# Patient Record
Sex: MALE | Race: ASIAN | NOT HISPANIC OR LATINO | Employment: FULL TIME | URBAN - METROPOLITAN AREA
[De-identification: names, ages, dates, MRNs, and addresses within clinical notes are randomized per-mention and may not be internally consistent; named-entity substitution may affect disease eponyms.]

---

## 2024-02-01 ENCOUNTER — HOSPITAL ENCOUNTER (EMERGENCY)
Facility: CLINIC | Age: 49
Discharge: LEFT BEFORE DISCHARGE | End: 2024-02-01
Admitting: EMERGENCY MEDICINE
Payer: COMMERCIAL

## 2024-02-01 VITALS
BODY MASS INDEX: 23.54 KG/M2 | HEART RATE: 133 BPM | HEIGHT: 67 IN | DIASTOLIC BLOOD PRESSURE: 103 MMHG | SYSTOLIC BLOOD PRESSURE: 183 MMHG | WEIGHT: 150 LBS | RESPIRATION RATE: 19 BRPM | TEMPERATURE: 98 F | OXYGEN SATURATION: 100 %

## 2024-02-01 LAB
ALBUMIN SERPL BCG-MCNC: 4.4 G/DL (ref 3.5–5.2)
ALP SERPL-CCNC: 121 U/L (ref 40–150)
ALT SERPL W P-5'-P-CCNC: 31 U/L (ref 0–70)
ANION GAP SERPL CALCULATED.3IONS-SCNC: 12 MMOL/L (ref 7–15)
AST SERPL W P-5'-P-CCNC: 26 U/L (ref 0–45)
BASOPHILS # BLD AUTO: 0.1 10E3/UL (ref 0–0.2)
BASOPHILS NFR BLD AUTO: 1 %
BILIRUB SERPL-MCNC: 0.3 MG/DL
BUN SERPL-MCNC: 16.7 MG/DL (ref 6–20)
CALCIUM SERPL-MCNC: 9.2 MG/DL (ref 8.6–10)
CHLORIDE SERPL-SCNC: 104 MMOL/L (ref 98–107)
CREAT SERPL-MCNC: 0.99 MG/DL (ref 0.67–1.17)
DEPRECATED HCO3 PLAS-SCNC: 22 MMOL/L (ref 22–29)
EGFRCR SERPLBLD CKD-EPI 2021: >90 ML/MIN/1.73M2
EOSINOPHIL # BLD AUTO: 0.4 10E3/UL (ref 0–0.7)
EOSINOPHIL NFR BLD AUTO: 4 %
ERYTHROCYTE [DISTWIDTH] IN BLOOD BY AUTOMATED COUNT: 11.9 % (ref 10–15)
GLUCOSE SERPL-MCNC: 196 MG/DL (ref 70–99)
HCT VFR BLD AUTO: 43.3 % (ref 40–53)
HGB BLD-MCNC: 14.6 G/DL (ref 13.3–17.7)
IMM GRANULOCYTES # BLD: 0 10E3/UL
IMM GRANULOCYTES NFR BLD: 0 %
LYMPHOCYTES # BLD AUTO: 4.3 10E3/UL (ref 0.8–5.3)
LYMPHOCYTES NFR BLD AUTO: 41 %
MCH RBC QN AUTO: 29.3 PG (ref 26.5–33)
MCHC RBC AUTO-ENTMCNC: 33.7 G/DL (ref 31.5–36.5)
MCV RBC AUTO: 87 FL (ref 78–100)
MONOCYTES # BLD AUTO: 0.7 10E3/UL (ref 0–1.3)
MONOCYTES NFR BLD AUTO: 7 %
NEUTROPHILS # BLD AUTO: 5 10E3/UL (ref 1.6–8.3)
NEUTROPHILS NFR BLD AUTO: 47 %
NRBC # BLD AUTO: 0 10E3/UL
NRBC BLD AUTO-RTO: 0 /100
PLATELET # BLD AUTO: 304 10E3/UL (ref 150–450)
POTASSIUM SERPL-SCNC: 3.9 MMOL/L (ref 3.4–5.3)
PROT SERPL-MCNC: 7.4 G/DL (ref 6.4–8.3)
RBC # BLD AUTO: 4.98 10E6/UL (ref 4.4–5.9)
SODIUM SERPL-SCNC: 138 MMOL/L (ref 135–145)
TROPONIN T SERPL HS-MCNC: 9 NG/L
WBC # BLD AUTO: 10.5 10E3/UL (ref 4–11)

## 2024-02-01 PROCEDURE — 93005 ELECTROCARDIOGRAM TRACING: CPT

## 2024-02-01 PROCEDURE — 84484 ASSAY OF TROPONIN QUANT: CPT | Performed by: EMERGENCY MEDICINE

## 2024-02-01 PROCEDURE — 36415 COLL VENOUS BLD VENIPUNCTURE: CPT | Performed by: EMERGENCY MEDICINE

## 2024-02-01 PROCEDURE — 80053 COMPREHEN METABOLIC PANEL: CPT | Performed by: EMERGENCY MEDICINE

## 2024-02-01 PROCEDURE — 99281 EMR DPT VST MAYX REQ PHY/QHP: CPT

## 2024-02-01 PROCEDURE — 85025 COMPLETE CBC W/AUTO DIFF WBC: CPT | Performed by: EMERGENCY MEDICINE

## 2024-02-01 RX ORDER — AMLODIPINE BESYLATE 5 MG/1
5 TABLET ORAL DAILY
Status: ON HOLD | COMMUNITY
Start: 2022-06-11 | End: 2024-02-15

## 2024-02-01 ASSESSMENT — ACTIVITIES OF DAILY LIVING (ADL): ADLS_ACUITY_SCORE: 35

## 2024-02-02 LAB
ATRIAL RATE - MUSE: 104 BPM
DIASTOLIC BLOOD PRESSURE - MUSE: NORMAL MMHG
INTERPRETATION ECG - MUSE: NORMAL
P AXIS - MUSE: 39 DEGREES
PR INTERVAL - MUSE: 130 MS
QRS DURATION - MUSE: 92 MS
QT - MUSE: 326 MS
QTC - MUSE: 428 MS
R AXIS - MUSE: 93 DEGREES
SYSTOLIC BLOOD PRESSURE - MUSE: NORMAL MMHG
T AXIS - MUSE: -3 DEGREES
VENTRICULAR RATE- MUSE: 104 BPM

## 2024-02-02 NOTE — ED TRIAGE NOTES
Here with a fast irregular heart rate. Took a 10 mg THC drink. Heart symptoms started about an hour later. Denies pain but does note some SOB. Rate 133 in triage.  NO Hx of heart issues       Triage Assessment (Adult)       Row Name 02/01/24 2055          Triage Assessment    Airway WDL WDL        Respiratory WDL    Respiratory WDL X  SOB        Skin Circulation/Temperature WDL    Skin Circulation/Temperature WDL WDL        Cardiac WDL    Cardiac WDL X  Tachy     Cardiac Rhythm ST        Cognitive/Neuro/Behavioral WDL    Cognitive/Neuro/Behavioral WDL WDL

## 2024-02-13 ENCOUNTER — HOSPITAL ENCOUNTER (INPATIENT)
Facility: CLINIC | Age: 49
LOS: 2 days | Discharge: HOME | End: 2024-02-15
Attending: EMERGENCY MEDICINE | Admitting: INTERNAL MEDICINE
Payer: COMMERCIAL

## 2024-02-13 ENCOUNTER — APPOINTMENT (OUTPATIENT)
Dept: MRI IMAGING | Facility: CLINIC | Age: 49
End: 2024-02-13
Attending: EMERGENCY MEDICINE
Payer: COMMERCIAL

## 2024-02-13 ENCOUNTER — APPOINTMENT (OUTPATIENT)
Dept: CT IMAGING | Facility: CLINIC | Age: 49
End: 2024-02-13
Attending: EMERGENCY MEDICINE
Payer: COMMERCIAL

## 2024-02-13 DIAGNOSIS — H53.2 DIPLOPIA: Primary | ICD-10-CM

## 2024-02-13 DIAGNOSIS — I77.74: ICD-10-CM

## 2024-02-13 DIAGNOSIS — G45.9 TIA (TRANSIENT ISCHEMIC ATTACK): ICD-10-CM

## 2024-02-13 DIAGNOSIS — I67.1 CEREBRAL ANEURYSM: ICD-10-CM

## 2024-02-13 LAB
ANION GAP SERPL CALCULATED.3IONS-SCNC: 8 MMOL/L (ref 7–15)
BASOPHILS # BLD AUTO: 0 10E3/UL (ref 0–0.2)
BASOPHILS NFR BLD AUTO: 1 %
BUN SERPL-MCNC: 13.4 MG/DL (ref 6–20)
CALCIUM SERPL-MCNC: 9.6 MG/DL (ref 8.6–10)
CHLORIDE SERPL-SCNC: 104 MMOL/L (ref 98–107)
CREAT SERPL-MCNC: 0.86 MG/DL (ref 0.67–1.17)
DEPRECATED HCO3 PLAS-SCNC: 27 MMOL/L (ref 22–29)
EGFRCR SERPLBLD CKD-EPI 2021: >90 ML/MIN/1.73M2
EOSINOPHIL # BLD AUTO: 0.3 10E3/UL (ref 0–0.7)
EOSINOPHIL NFR BLD AUTO: 4 %
ERYTHROCYTE [DISTWIDTH] IN BLOOD BY AUTOMATED COUNT: 11.9 % (ref 10–15)
GLUCOSE SERPL-MCNC: 106 MG/DL (ref 70–99)
HCT VFR BLD AUTO: 43.1 % (ref 40–53)
HGB BLD-MCNC: 14.9 G/DL (ref 13.3–17.7)
IMM GRANULOCYTES # BLD: 0 10E3/UL
IMM GRANULOCYTES NFR BLD: 0 %
LYMPHOCYTES # BLD AUTO: 3.1 10E3/UL (ref 0.8–5.3)
LYMPHOCYTES NFR BLD AUTO: 42 %
MCH RBC QN AUTO: 30 PG (ref 26.5–33)
MCHC RBC AUTO-ENTMCNC: 34.6 G/DL (ref 31.5–36.5)
MCV RBC AUTO: 87 FL (ref 78–100)
MONOCYTES # BLD AUTO: 0.6 10E3/UL (ref 0–1.3)
MONOCYTES NFR BLD AUTO: 8 %
NEUTROPHILS # BLD AUTO: 3.4 10E3/UL (ref 1.6–8.3)
NEUTROPHILS NFR BLD AUTO: 45 %
NRBC # BLD AUTO: 0 10E3/UL
NRBC BLD AUTO-RTO: 0 /100
PLATELET # BLD AUTO: 300 10E3/UL (ref 150–450)
POTASSIUM SERPL-SCNC: 5 MMOL/L (ref 3.4–5.3)
RBC # BLD AUTO: 4.97 10E6/UL (ref 4.4–5.9)
SODIUM SERPL-SCNC: 139 MMOL/L (ref 135–145)
TROPONIN T SERPL HS-MCNC: <6 NG/L
WBC # BLD AUTO: 7.5 10E3/UL (ref 4–11)

## 2024-02-13 PROCEDURE — 83036 HEMOGLOBIN GLYCOSYLATED A1C: CPT | Performed by: INTERNAL MEDICINE

## 2024-02-13 PROCEDURE — 99222 1ST HOSP IP/OBS MODERATE 55: CPT | Performed by: INTERNAL MEDICINE

## 2024-02-13 PROCEDURE — 258N000003 HC RX IP 258 OP 636: Performed by: EMERGENCY MEDICINE

## 2024-02-13 PROCEDURE — 84484 ASSAY OF TROPONIN QUANT: CPT | Performed by: EMERGENCY MEDICINE

## 2024-02-13 PROCEDURE — 99285 EMERGENCY DEPT VISIT HI MDM: CPT | Mod: 25

## 2024-02-13 PROCEDURE — 70553 MRI BRAIN STEM W/O & W/DYE: CPT

## 2024-02-13 PROCEDURE — A9585 GADOBUTROL INJECTION: HCPCS | Performed by: EMERGENCY MEDICINE

## 2024-02-13 PROCEDURE — 120N000001 HC R&B MED SURG/OB

## 2024-02-13 PROCEDURE — 250N000011 HC RX IP 250 OP 636: Performed by: EMERGENCY MEDICINE

## 2024-02-13 PROCEDURE — 250N000011 HC RX IP 250 OP 636: Performed by: INTERNAL MEDICINE

## 2024-02-13 PROCEDURE — 70450 CT HEAD/BRAIN W/O DYE: CPT

## 2024-02-13 PROCEDURE — 250N000009 HC RX 250: Performed by: EMERGENCY MEDICINE

## 2024-02-13 PROCEDURE — 80048 BASIC METABOLIC PNL TOTAL CA: CPT | Performed by: EMERGENCY MEDICINE

## 2024-02-13 PROCEDURE — 70544 MR ANGIOGRAPHY HEAD W/O DYE: CPT

## 2024-02-13 PROCEDURE — 70496 CT ANGIOGRAPHY HEAD: CPT

## 2024-02-13 PROCEDURE — 36415 COLL VENOUS BLD VENIPUNCTURE: CPT | Performed by: EMERGENCY MEDICINE

## 2024-02-13 PROCEDURE — 85025 COMPLETE CBC W/AUTO DIFF WBC: CPT | Performed by: EMERGENCY MEDICINE

## 2024-02-13 PROCEDURE — 250N000013 HC RX MED GY IP 250 OP 250 PS 637: Performed by: INTERNAL MEDICINE

## 2024-02-13 PROCEDURE — 96361 HYDRATE IV INFUSION ADD-ON: CPT

## 2024-02-13 PROCEDURE — 96374 THER/PROPH/DIAG INJ IV PUSH: CPT | Mod: 59

## 2024-02-13 PROCEDURE — 255N000002 HC RX 255 OP 636: Performed by: EMERGENCY MEDICINE

## 2024-02-13 PROCEDURE — 93005 ELECTROCARDIOGRAM TRACING: CPT

## 2024-02-13 RX ORDER — GADOBUTROL 604.72 MG/ML
7 INJECTION INTRAVENOUS ONCE
Status: COMPLETED | OUTPATIENT
Start: 2024-02-13 | End: 2024-02-13

## 2024-02-13 RX ORDER — LIDOCAINE 40 MG/G
CREAM TOPICAL
Status: DISCONTINUED | OUTPATIENT
Start: 2024-02-13 | End: 2024-02-15 | Stop reason: HOSPADM

## 2024-02-13 RX ORDER — HYDRALAZINE HYDROCHLORIDE 20 MG/ML
10-20 INJECTION INTRAMUSCULAR; INTRAVENOUS
Status: DISCONTINUED | OUTPATIENT
Start: 2024-02-13 | End: 2024-02-15 | Stop reason: HOSPADM

## 2024-02-13 RX ORDER — HYDROMORPHONE HYDROCHLORIDE 1 MG/ML
0.3 INJECTION, SOLUTION INTRAMUSCULAR; INTRAVENOUS; SUBCUTANEOUS
Status: DISCONTINUED | OUTPATIENT
Start: 2024-02-13 | End: 2024-02-15 | Stop reason: HOSPADM

## 2024-02-13 RX ORDER — AMLODIPINE BESYLATE 10 MG/1
10 TABLET ORAL DAILY
Status: DISCONTINUED | OUTPATIENT
Start: 2024-02-14 | End: 2024-02-15 | Stop reason: HOSPADM

## 2024-02-13 RX ORDER — ONDANSETRON 2 MG/ML
4 INJECTION INTRAMUSCULAR; INTRAVENOUS EVERY 6 HOURS PRN
Status: DISCONTINUED | OUTPATIENT
Start: 2024-02-13 | End: 2024-02-15 | Stop reason: HOSPADM

## 2024-02-13 RX ORDER — LABETALOL HYDROCHLORIDE 5 MG/ML
10-20 INJECTION, SOLUTION INTRAVENOUS EVERY 10 MIN PRN
Status: DISCONTINUED | OUTPATIENT
Start: 2024-02-13 | End: 2024-02-15 | Stop reason: HOSPADM

## 2024-02-13 RX ORDER — LABETALOL HYDROCHLORIDE 5 MG/ML
10 INJECTION, SOLUTION INTRAVENOUS ONCE
Status: COMPLETED | OUTPATIENT
Start: 2024-02-13 | End: 2024-02-13

## 2024-02-13 RX ORDER — PROCHLORPERAZINE 25 MG
25 SUPPOSITORY, RECTAL RECTAL EVERY 12 HOURS PRN
Status: DISCONTINUED | OUTPATIENT
Start: 2024-02-13 | End: 2024-02-15 | Stop reason: HOSPADM

## 2024-02-13 RX ORDER — ACETAMINOPHEN 325 MG/1
650 TABLET ORAL EVERY 4 HOURS PRN
Status: DISCONTINUED | OUTPATIENT
Start: 2024-02-13 | End: 2024-02-15 | Stop reason: HOSPADM

## 2024-02-13 RX ORDER — ACETAMINOPHEN 650 MG/1
650 SUPPOSITORY RECTAL EVERY 4 HOURS PRN
Status: DISCONTINUED | OUTPATIENT
Start: 2024-02-13 | End: 2024-02-15 | Stop reason: HOSPADM

## 2024-02-13 RX ORDER — IOPAMIDOL 755 MG/ML
67 INJECTION, SOLUTION INTRAVASCULAR ONCE
Status: COMPLETED | OUTPATIENT
Start: 2024-02-13 | End: 2024-02-13

## 2024-02-13 RX ORDER — PROCHLORPERAZINE MALEATE 10 MG
10 TABLET ORAL EVERY 6 HOURS PRN
Status: DISCONTINUED | OUTPATIENT
Start: 2024-02-13 | End: 2024-02-15 | Stop reason: HOSPADM

## 2024-02-13 RX ORDER — ASPIRIN 81 MG/1
81 TABLET ORAL DAILY
Status: DISCONTINUED | OUTPATIENT
Start: 2024-02-13 | End: 2024-02-15 | Stop reason: HOSPADM

## 2024-02-13 RX ORDER — ONDANSETRON 4 MG/1
4 TABLET, ORALLY DISINTEGRATING ORAL EVERY 6 HOURS PRN
Status: DISCONTINUED | OUTPATIENT
Start: 2024-02-13 | End: 2024-02-15 | Stop reason: HOSPADM

## 2024-02-13 RX ORDER — SODIUM CHLORIDE 9 MG/ML
INJECTION, SOLUTION INTRAVENOUS CONTINUOUS PRN
Status: DISCONTINUED | OUTPATIENT
Start: 2024-02-13 | End: 2024-02-15 | Stop reason: HOSPADM

## 2024-02-13 RX ORDER — OXYCODONE HYDROCHLORIDE 5 MG/1
5 TABLET ORAL EVERY 4 HOURS PRN
Status: DISCONTINUED | OUTPATIENT
Start: 2024-02-13 | End: 2024-02-15 | Stop reason: HOSPADM

## 2024-02-13 RX ADMIN — LABETALOL HYDROCHLORIDE 10 MG: 5 INJECTION INTRAVENOUS at 22:49

## 2024-02-13 RX ADMIN — GADOBUTROL 7 ML: 604.72 INJECTION INTRAVENOUS at 20:57

## 2024-02-13 RX ADMIN — SODIUM CHLORIDE 90 ML: 9 INJECTION, SOLUTION INTRAVENOUS at 18:08

## 2024-02-13 RX ADMIN — ASPIRIN 81 MG: 81 TABLET, COATED ORAL at 22:48

## 2024-02-13 RX ADMIN — IOPAMIDOL 67 ML: 755 INJECTION, SOLUTION INTRAVENOUS at 18:08

## 2024-02-13 RX ADMIN — LABETALOL HYDROCHLORIDE 10 MG: 5 INJECTION INTRAVENOUS at 21:12

## 2024-02-13 RX ADMIN — SODIUM CHLORIDE 1000 ML: 9 INJECTION, SOLUTION INTRAVENOUS at 17:09

## 2024-02-13 RX ADMIN — LABETALOL HYDROCHLORIDE 10 MG: 5 INJECTION INTRAVENOUS at 21:58

## 2024-02-13 ASSESSMENT — ACTIVITIES OF DAILY LIVING (ADL)
ADLS_ACUITY_SCORE: 35

## 2024-02-13 NOTE — ED NOTES
PIT/Triage Evaluation    Patient presented with vision issues. Silas sates that since 1200 he has had 3 episodes when he gets double vision and balance issues. The episodes last a few minuets. His wife adds that she when she had him follow her finger his left eye is not symmetrical and slower than the right eye.     Exam is notable for:  General: Well-nourished, no acute distress  Eyes: PERRL, conjunctivae pink no scleral icterus or conjunctival injection  ENT:  Moist mucus membranes  Respiratory:  No respiratory distress  CV: Normal rate   Skin: Warm, dry.  No rashes or petechiae  Musculoskeletal: No peripheral edema or calf tenderness  Neuro: Alert and oriented to person/place/time. PERRL, EOMI no nystagmus, no aphasia/facial droop/dysarthria, tongue midline, symmetric palatal elevation, normal strength at SCM/trapezius/BUE/BLE, normal coordination to FNF at BUE, normal casual gait, negative romberg, sensation intact to LT over face/BUE/BLE  Psychiatric: Normal affect    Appropriate interventions for symptom management were initiated if applicable.  Appropriate diagnostic tests were initiated if indicated.    Important information for subsequent clinician:  No symptoms at this time. Normal neuro exam at this time.  Does not meet criteria for code stroke at this time as symptoms have recurred.  However, requested that he or his wife to alert us if he develops any new neurologic symptoms.  Ordered labs, EKG and CT CTA.    I briefly evaluated the patient and developed an initial plan of care. I discussed this plan and explained that this brief interaction does not constitute a full evaluation. Patient/family understands that they should wait to be fully evaluated and discuss any test results with another clinician prior to leaving the hospital.       Diane Webster MD  02/13/24 0543

## 2024-02-14 ENCOUNTER — APPOINTMENT (OUTPATIENT)
Dept: CARDIOLOGY | Facility: CLINIC | Age: 49
End: 2024-02-14
Attending: INTERNAL MEDICINE
Payer: COMMERCIAL

## 2024-02-14 ENCOUNTER — APPOINTMENT (OUTPATIENT)
Dept: INTERVENTIONAL RADIOLOGY/VASCULAR | Facility: CLINIC | Age: 49
End: 2024-02-14
Payer: COMMERCIAL

## 2024-02-14 LAB
ANION GAP SERPL CALCULATED.3IONS-SCNC: 10 MMOL/L (ref 7–15)
ANION GAP SERPL CALCULATED.3IONS-SCNC: 8 MMOL/L (ref 7–15)
ATRIAL RATE - MUSE: 74 BPM
BUN SERPL-MCNC: 10.6 MG/DL (ref 6–20)
BUN SERPL-MCNC: 8.3 MG/DL (ref 6–20)
CALCIUM SERPL-MCNC: 9 MG/DL (ref 8.6–10)
CALCIUM SERPL-MCNC: 9.1 MG/DL (ref 8.6–10)
CHLORIDE SERPL-SCNC: 107 MMOL/L (ref 98–107)
CHLORIDE SERPL-SCNC: 107 MMOL/L (ref 98–107)
CHOLEST SERPL-MCNC: 202 MG/DL
CHOLEST SERPL-MCNC: 208 MG/DL
CREAT SERPL-MCNC: 0.78 MG/DL (ref 0.67–1.17)
CREAT SERPL-MCNC: 0.94 MG/DL (ref 0.67–1.17)
DEPRECATED HCO3 PLAS-SCNC: 24 MMOL/L (ref 22–29)
DEPRECATED HCO3 PLAS-SCNC: 26 MMOL/L (ref 22–29)
DIASTOLIC BLOOD PRESSURE - MUSE: NORMAL MMHG
EGFRCR SERPLBLD CKD-EPI 2021: >90 ML/MIN/1.73M2
EGFRCR SERPLBLD CKD-EPI 2021: >90 ML/MIN/1.73M2
ERYTHROCYTE [DISTWIDTH] IN BLOOD BY AUTOMATED COUNT: 12 % (ref 10–15)
ERYTHROCYTE [DISTWIDTH] IN BLOOD BY AUTOMATED COUNT: 12 % (ref 10–15)
GLUCOSE BLDC GLUCOMTR-MCNC: 101 MG/DL (ref 70–99)
GLUCOSE SERPL-MCNC: 103 MG/DL (ref 70–99)
GLUCOSE SERPL-MCNC: 123 MG/DL (ref 70–99)
HBA1C MFR BLD: 5.8 %
HCT VFR BLD AUTO: 40.9 % (ref 40–53)
HCT VFR BLD AUTO: 41.1 % (ref 40–53)
HDLC SERPL-MCNC: 37 MG/DL
HDLC SERPL-MCNC: 37 MG/DL
HGB BLD-MCNC: 13.9 G/DL (ref 13.3–17.7)
HGB BLD-MCNC: 14.2 G/DL (ref 13.3–17.7)
INR PPP: 1.14 (ref 0.85–1.15)
INTERPRETATION ECG - MUSE: NORMAL
LDLC SERPL CALC-MCNC: 125 MG/DL
LDLC SERPL CALC-MCNC: 132 MG/DL
LVEF ECHO: NORMAL
MCH RBC QN AUTO: 29.6 PG (ref 26.5–33)
MCH RBC QN AUTO: 30.1 PG (ref 26.5–33)
MCHC RBC AUTO-ENTMCNC: 33.8 G/DL (ref 31.5–36.5)
MCHC RBC AUTO-ENTMCNC: 34.7 G/DL (ref 31.5–36.5)
MCV RBC AUTO: 87 FL (ref 78–100)
MCV RBC AUTO: 88 FL (ref 78–100)
NONHDLC SERPL-MCNC: 165 MG/DL
NONHDLC SERPL-MCNC: 171 MG/DL
P AXIS - MUSE: 41 DEGREES
PLATELET # BLD AUTO: 271 10E3/UL (ref 150–450)
PLATELET # BLD AUTO: 275 10E3/UL (ref 150–450)
POTASSIUM SERPL-SCNC: 3.9 MMOL/L (ref 3.4–5.3)
POTASSIUM SERPL-SCNC: 4 MMOL/L (ref 3.4–5.3)
PR INTERVAL - MUSE: 144 MS
QRS DURATION - MUSE: 104 MS
QT - MUSE: 388 MS
QTC - MUSE: 430 MS
R AXIS - MUSE: 80 DEGREES
RBC # BLD AUTO: 4.69 10E6/UL (ref 4.4–5.9)
RBC # BLD AUTO: 4.71 10E6/UL (ref 4.4–5.9)
SODIUM SERPL-SCNC: 141 MMOL/L (ref 135–145)
SODIUM SERPL-SCNC: 141 MMOL/L (ref 135–145)
SYSTOLIC BLOOD PRESSURE - MUSE: NORMAL MMHG
T AXIS - MUSE: 67 DEGREES
TRIGL SERPL-MCNC: 193 MG/DL
TRIGL SERPL-MCNC: 201 MG/DL
VENTRICULAR RATE- MUSE: 74 BPM
WBC # BLD AUTO: 7 10E3/UL (ref 4–11)
WBC # BLD AUTO: 7.9 10E3/UL (ref 4–11)

## 2024-02-14 PROCEDURE — 120N000013 HC R&B IMCU

## 2024-02-14 PROCEDURE — 80048 BASIC METABOLIC PNL TOTAL CA: CPT | Performed by: INTERNAL MEDICINE

## 2024-02-14 PROCEDURE — 250N000009 HC RX 250: Performed by: STUDENT IN AN ORGANIZED HEALTH CARE EDUCATION/TRAINING PROGRAM

## 2024-02-14 PROCEDURE — 999N000208 ECHOCARDIOGRAM COMPLETE

## 2024-02-14 PROCEDURE — 76937 US GUIDE VASCULAR ACCESS: CPT

## 2024-02-14 PROCEDURE — 255N000002 HC RX 255 OP 636: Performed by: NEUROLOGICAL SURGERY

## 2024-02-14 PROCEDURE — 99152 MOD SED SAME PHYS/QHP 5/>YRS: CPT | Mod: GC | Performed by: NEUROLOGICAL SURGERY

## 2024-02-14 PROCEDURE — 99232 SBSQ HOSP IP/OBS MODERATE 35: CPT | Performed by: HOSPITALIST

## 2024-02-14 PROCEDURE — 120N000001 HC R&B MED SURG/OB

## 2024-02-14 PROCEDURE — 272N000196 HC ACCESSORY CR5

## 2024-02-14 PROCEDURE — 250N000013 HC RX MED GY IP 250 OP 250 PS 637: Performed by: INTERNAL MEDICINE

## 2024-02-14 PROCEDURE — 258N000003 HC RX IP 258 OP 636: Performed by: INTERNAL MEDICINE

## 2024-02-14 PROCEDURE — 36415 COLL VENOUS BLD VENIPUNCTURE: CPT | Performed by: STUDENT IN AN ORGANIZED HEALTH CARE EDUCATION/TRAINING PROGRAM

## 2024-02-14 PROCEDURE — 85027 COMPLETE CBC AUTOMATED: CPT | Performed by: INTERNAL MEDICINE

## 2024-02-14 PROCEDURE — 272N000567 HC SHEATH CR4

## 2024-02-14 PROCEDURE — 36224 PLACE CATH CAROTD ART: CPT | Mod: 50 | Performed by: NEUROLOGICAL SURGERY

## 2024-02-14 PROCEDURE — C1769 GUIDE WIRE: HCPCS

## 2024-02-14 PROCEDURE — 272N000570 HC SHEATH CR7

## 2024-02-14 PROCEDURE — 80048 BASIC METABOLIC PNL TOTAL CA: CPT | Performed by: STUDENT IN AN ORGANIZED HEALTH CARE EDUCATION/TRAINING PROGRAM

## 2024-02-14 PROCEDURE — 85027 COMPLETE CBC AUTOMATED: CPT | Performed by: STUDENT IN AN ORGANIZED HEALTH CARE EDUCATION/TRAINING PROGRAM

## 2024-02-14 PROCEDURE — 99152 MOD SED SAME PHYS/QHP 5/>YRS: CPT

## 2024-02-14 PROCEDURE — C1760 CLOSURE DEV, VASC: HCPCS

## 2024-02-14 PROCEDURE — 250N000011 HC RX IP 250 OP 636: Performed by: STUDENT IN AN ORGANIZED HEALTH CARE EDUCATION/TRAINING PROGRAM

## 2024-02-14 PROCEDURE — 82465 ASSAY BLD/SERUM CHOLESTEROL: CPT | Performed by: INTERNAL MEDICINE

## 2024-02-14 PROCEDURE — 36222 PLACE CATH CAROTID/INOM ART: CPT

## 2024-02-14 PROCEDURE — 36415 COLL VENOUS BLD VENIPUNCTURE: CPT | Performed by: INTERNAL MEDICINE

## 2024-02-14 PROCEDURE — 93306 TTE W/DOPPLER COMPLETE: CPT | Mod: 26 | Performed by: INTERNAL MEDICINE

## 2024-02-14 PROCEDURE — 85610 PROTHROMBIN TIME: CPT | Performed by: STUDENT IN AN ORGANIZED HEALTH CARE EDUCATION/TRAINING PROGRAM

## 2024-02-14 PROCEDURE — 999N000147 HC STATISTIC PT IP EVAL DEFER

## 2024-02-14 PROCEDURE — C1887 CATHETER, GUIDING: HCPCS

## 2024-02-14 PROCEDURE — 99222 1ST HOSP IP/OBS MODERATE 55: CPT | Mod: GC | Performed by: PSYCHIATRY & NEUROLOGY

## 2024-02-14 PROCEDURE — 250N000013 HC RX MED GY IP 250 OP 250 PS 637: Performed by: STUDENT IN AN ORGANIZED HEALTH CARE EDUCATION/TRAINING PROGRAM

## 2024-02-14 RX ORDER — NALOXONE HYDROCHLORIDE 0.4 MG/ML
0.4 INJECTION, SOLUTION INTRAMUSCULAR; INTRAVENOUS; SUBCUTANEOUS
Status: DISCONTINUED | OUTPATIENT
Start: 2024-02-14 | End: 2024-02-15 | Stop reason: HOSPADM

## 2024-02-14 RX ORDER — NALOXONE HYDROCHLORIDE 0.4 MG/ML
0.4 INJECTION, SOLUTION INTRAMUSCULAR; INTRAVENOUS; SUBCUTANEOUS
Status: DISCONTINUED | OUTPATIENT
Start: 2024-02-14 | End: 2024-02-14

## 2024-02-14 RX ORDER — IOPAMIDOL 612 MG/ML
100 INJECTION, SOLUTION INTRAVASCULAR ONCE
Status: COMPLETED | OUTPATIENT
Start: 2024-02-14 | End: 2024-02-14

## 2024-02-14 RX ORDER — SODIUM CHLORIDE 9 MG/ML
INJECTION, SOLUTION INTRAVENOUS CONTINUOUS
Status: DISCONTINUED | OUTPATIENT
Start: 2024-02-14 | End: 2024-02-15

## 2024-02-14 RX ORDER — FENTANYL CITRATE 50 UG/ML
25-50 INJECTION, SOLUTION INTRAMUSCULAR; INTRAVENOUS EVERY 5 MIN PRN
Status: DISCONTINUED | OUTPATIENT
Start: 2024-02-14 | End: 2024-02-14

## 2024-02-14 RX ORDER — LIDOCAINE HYDROCHLORIDE AND EPINEPHRINE 10; 10 MG/ML; UG/ML
1-30 INJECTION, SOLUTION INFILTRATION; PERINEURAL
Status: DISCONTINUED | OUTPATIENT
Start: 2024-02-14 | End: 2024-02-14

## 2024-02-14 RX ORDER — HEPARIN SODIUM 200 [USP'U]/100ML
1 INJECTION, SOLUTION INTRAVENOUS CONTINUOUS PRN
Status: DISCONTINUED | OUTPATIENT
Start: 2024-02-14 | End: 2024-02-14

## 2024-02-14 RX ORDER — FLUMAZENIL 0.1 MG/ML
0.2 INJECTION, SOLUTION INTRAVENOUS
Status: DISCONTINUED | OUTPATIENT
Start: 2024-02-14 | End: 2024-02-14

## 2024-02-14 RX ORDER — LIDOCAINE 40 MG/G
CREAM TOPICAL
Status: DISCONTINUED | OUTPATIENT
Start: 2024-02-14 | End: 2024-02-15 | Stop reason: HOSPADM

## 2024-02-14 RX ORDER — NALOXONE HYDROCHLORIDE 0.4 MG/ML
0.2 INJECTION, SOLUTION INTRAMUSCULAR; INTRAVENOUS; SUBCUTANEOUS
Status: DISCONTINUED | OUTPATIENT
Start: 2024-02-14 | End: 2024-02-15 | Stop reason: HOSPADM

## 2024-02-14 RX ORDER — NALOXONE HYDROCHLORIDE 0.4 MG/ML
0.2 INJECTION, SOLUTION INTRAMUSCULAR; INTRAVENOUS; SUBCUTANEOUS
Status: DISCONTINUED | OUTPATIENT
Start: 2024-02-14 | End: 2024-02-14

## 2024-02-14 RX ORDER — CLOPIDOGREL BISULFATE 75 MG/1
75 TABLET ORAL DAILY
Status: DISCONTINUED | OUTPATIENT
Start: 2024-02-14 | End: 2024-02-15 | Stop reason: HOSPADM

## 2024-02-14 RX ORDER — HEPARIN SODIUM 1000 [USP'U]/ML
3000 INJECTION, SOLUTION INTRAVENOUS; SUBCUTANEOUS
Status: DISCONTINUED | OUTPATIENT
Start: 2024-02-14 | End: 2024-02-14

## 2024-02-14 RX ORDER — HEPARIN SODIUM 1000 [USP'U]/ML
5000 INJECTION, SOLUTION INTRAVENOUS; SUBCUTANEOUS ONCE
Status: COMPLETED | OUTPATIENT
Start: 2024-02-14 | End: 2024-02-14

## 2024-02-14 RX ADMIN — IOPAMIDOL 40 ML: 612 INJECTION, SOLUTION INTRAVENOUS at 14:16

## 2024-02-14 RX ADMIN — AMLODIPINE BESYLATE 10 MG: 10 TABLET ORAL at 10:26

## 2024-02-14 RX ADMIN — LIDOCAINE HYDROCHLORIDE 20 ML: 10 INJECTION, SOLUTION INFILTRATION; PERINEURAL at 13:48

## 2024-02-14 RX ADMIN — SODIUM CHLORIDE: 9 INJECTION, SOLUTION INTRAVENOUS at 01:50

## 2024-02-14 RX ADMIN — ACETAMINOPHEN 650 MG: 325 TABLET, FILM COATED ORAL at 19:39

## 2024-02-14 RX ADMIN — FENTANYL CITRATE 50 MCG: 50 INJECTION, SOLUTION INTRAMUSCULAR; INTRAVENOUS at 14:05

## 2024-02-14 RX ADMIN — MIDAZOLAM 1 MG: 1 INJECTION INTRAMUSCULAR; INTRAVENOUS at 13:42

## 2024-02-14 RX ADMIN — CLOPIDOGREL BISULFATE 75 MG: 75 TABLET ORAL at 17:59

## 2024-02-14 RX ADMIN — ACETAMINOPHEN 650 MG: 325 TABLET, FILM COATED ORAL at 01:50

## 2024-02-14 RX ADMIN — ASPIRIN 81 MG: 81 TABLET, COATED ORAL at 10:25

## 2024-02-14 RX ADMIN — HEPARIN SODIUM 3 BAG: 200 INJECTION, SOLUTION INTRAVENOUS at 13:44

## 2024-02-14 RX ADMIN — MIDAZOLAM 1 MG: 1 INJECTION INTRAMUSCULAR; INTRAVENOUS at 13:55

## 2024-02-14 RX ADMIN — HEPARIN SODIUM 5000 UNITS: 1000 INJECTION INTRAVENOUS; SUBCUTANEOUS at 13:52

## 2024-02-14 RX ADMIN — FENTANYL CITRATE 50 MCG: 50 INJECTION, SOLUTION INTRAMUSCULAR; INTRAVENOUS at 13:52

## 2024-02-14 ASSESSMENT — ACTIVITIES OF DAILY LIVING (ADL)
ADLS_ACUITY_SCORE: 18
ADLS_ACUITY_SCORE: 19
ADLS_ACUITY_SCORE: 18
ADLS_ACUITY_SCORE: 35
ADLS_ACUITY_SCORE: 18
ADLS_ACUITY_SCORE: 18

## 2024-02-14 NOTE — ED PROVIDER NOTES
History     Chief Complaint:  Eye Problem and Vision Loss     The history is provided by the patient.      Silas Lima is a 48 year old male with history of hypertension and hyperlipidemia who presents to the ED with his wife for evaluation of transient double vision. The patient reports that he has had double vision episodes today. He normally wears glasses, but around noon he felt like his eyes couldn't focus. 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 He states he tried to stand up, but was unsteady. This episode lasted about 5 minutes. Patient had a second episode with similar symptoms that also lasted about 5 minutes. His wife tracked his eyes during this second episode and she reports that the patient's left eye was drifting more inwards. He adds that he also had light sensitivity during these episodes. He denies a headache during the episodes, but endorses a slight left sided headache now. Patient has been able to walk and upon my initial evaluation, his vision symptoms have resolved. Denies history of migraines. Denies one sided numbness or weakness, neck stiffness or pain, fever, chills, rhinorrhea, cough, congestion, sore throat, hearing problems, chest pain, trouble breathing, nausea, abdominal pain, diarrhea, black or bloody stool, dysuria, or hematuria. Denies tobacco use or recent excessive caffeine intake. Notes family history of diabetes and cardiovascular issues.    Independent Historian:   Spouse/Partner - They report as noted above.    Review of External Notes:   Nursing telephone triage note from today     Medications:    Norvasc    Past Medical History:    Hyperlipidemia  Otitis externa  Hypertension  Hypertriglyceridemia     Past Surgical History:    The patient has no pertinent past surgical history.      Physical Exam   Patient Vitals for the past 24 hrs:   BP Temp Temp src Pulse Resp SpO2 Height Weight   02/13/24 2131 -- -- -- 75 13 98 % -- --   02/13/24 2130 (!) 145/116 -- -- 74  "-- -- -- --   02/13/24 2116 (!) 144/104 -- -- 76 14 98 % -- --   02/13/24 2101 (!) 149/108 -- -- 89 -- 100 % -- --   02/13/24 2001 (!) 150/107 -- -- -- -- -- -- --   02/13/24 1946 -- -- -- -- -- 96 % -- --   02/13/24 1945 (!) 149/107 -- -- 80 -- -- -- --   02/13/24 1931 (!) 153/111 -- -- 83 -- 97 % -- --   02/13/24 1918 (!) 152/108 -- -- 79 -- -- -- --   02/13/24 1706 -- -- -- -- -- 97 % -- --   02/13/24 1705 -- -- -- -- -- 96 % -- --   02/13/24 1704 (!) 155/112 -- -- 80 -- -- -- --   02/13/24 1619 -- -- -- -- -- -- 1.702 m (5' 7\") 68 kg (150 lb)   02/13/24 1617 (!) 161/98 97.8  F (36.6  C) Temporal 94 16 99 % -- --      Constitutional:       General: Not in acute distress.     Appearance: Normal appearance  HENT:      Head: Normocephalic and atraumatic.   Eyes:     Extraocular Movements: Extraocular movements intact.      Conjunctiva/sclera: Conjunctivae normal.      Pupils: Pupils are equal, round, and reactive to light.   Cardiovascular:     Rate and Rhythm: Normal rate and regular rhythm.   Pulmonary:      Effort: Pulmonary effort is normal.      Breath sounds: Normal breath sounds.   Abdominal:      General: Abdomen is flat. There is no distension.      Palpations: Abdomen is soft.      Tenderness: There is no abdominal tenderness.   Musculoskeletal:      Cervical back: Normal range of motion and neck supple. No rigidity.      General: No swelling or deformity.   Skin:     General: Skin is warm and dry.   Neurological:      General: No focal deficit present.     NIHSS: Patient alert and keenly responsive. Able to answer month and age. Able and blink eyes and squeeze hands. No gaze palsy. No visual field deficits. No facial palsy. No arm drift bilaterally. No leg drift bilaterally. No limb ataxia. Able to complete finger nose finger and heel to shin. No sensory deficits. No language deficits/aphasia. No dysarthria. No extinction/inattention.      NIHSS score of 0.        Mental Status: Alert and oriented to " person, place, and time.   Psychiatric:         Mood and Affect: Mood normal.         Behavior: Behavior normal.     Emergency Department Course   ECG  ECG results from 02/13/24   EKG 12 lead     Value    Systolic Blood Pressure     Diastolic Blood Pressure     Ventricular Rate 74    Atrial Rate 74    ID Interval 144    QRS Duration 104        QTc 430    P Axis 41    R AXIS 80    T Axis 67    Interpretation ECG      Sinus rhythm  Incomplete right bundle branch block  Borderline ECG  When compared with ECG of 01-FEB-2024 20:50,  Incomplete right bundle branch block is now Present        Normal sinus rhythm.  Rate of 74.  Normal ID and QRS.  Normal QTc.  No acute ST elevation or depression as compared with 2/1/2024 EKG.    Imaging:  CTA Head Neck with Contrast   Final Result   Addendum (preliminary) 1 of 1   Dr. Carey was contacted by me on 2/13/2024 7:13 PM CST.      Final   IMPRESSION:    HEAD CT:   1.  No acute intracranial process.   2.  13 mm hyperdensity in the right premedullary cistern with mild mass effect on the medulla, described in the CTA section below.      HEAD CTA:    1.  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 Contrast enhanced brain MRI and MRA head recommended to further characterize.   2.  No large vessel occlusion or hemodynamically significant stenosis.      NECK CTA:   1.  No large vessel occlusion or hemodynamically significant stenosis.      CT Head w/o Contrast   Final Result   Addendum (preliminary) 1 of 1   Dr. Carey was contacted by me on 2/13/2024 7:13 PM CST.      Final   IMPRESSION:    HEAD CT:   1.  No acute intracranial process.   2.  13 mm hyperdensity in the right premedullary cistern with mild mass effect on the medulla, described in the CTA section below.      HEAD CTA:    1.  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 Contrast enhanced brain MRI and MRA head recommended to further characterize.   2.  No large vessel  occlusion or hemodynamically significant stenosis.      NECK CTA:   1.  No large vessel occlusion or hemodynamically significant stenosis.      MR Brain w/o & w Contrast    (Results Pending)   MRA Angiogram Head w/o Contrast    (Results Pending)      Report per radiology    Laboratory:  Labs Ordered and Resulted from Time of ED Arrival to Time of ED Departure   BASIC METABOLIC PANEL - Abnormal       Result Value    Sodium 139      Potassium 5.0      Chloride 104      Carbon Dioxide (CO2) 27      Anion Gap 8      Urea Nitrogen 13.4      Creatinine 0.86      GFR Estimate >90      Calcium 9.6      Glucose 106 (*)    TROPONIN T, HIGH SENSITIVITY - Normal    Troponin T, High Sensitivity <6     CBC WITH PLATELETS AND DIFFERENTIAL    WBC Count 7.5      RBC Count 4.97      Hemoglobin 14.9      Hematocrit 43.1      MCV 87      MCH 30.0      MCHC 34.6      RDW 11.9      Platelet Count 300      % Neutrophils 45      % Lymphocytes 42      % Monocytes 8      % Eosinophils 4      % Basophils 1      % Immature Granulocytes 0      NRBCs per 100 WBC 0      Absolute Neutrophils 3.4      Absolute Lymphocytes 3.1      Absolute Monocytes 0.6      Absolute Eosinophils 0.3      Absolute Basophils 0.0      Absolute Immature Granulocytes 0.0      Absolute NRBCs 0.0        Emergency Department Course & Assessments:     Interventions:  Medications   labetalol (NORMODYNE/TRANDATE) injection 10 mg (has no administration in time range)   sodium chloride 0.9% BOLUS 1,000 mL (0 mLs Intravenous Stopped 2/13/24 1919)   iopamidol (ISOVUE-370) solution 67 mL (67 mLs Intravenous $Given 2/13/24 1808)   sodium chloride 0.9 % bag 500 mL for CT scan flush use (90 mLs Intravenous $Given 2/13/24 1808)   labetalol (NORMODYNE/TRANDATE) injection 10 mg (10 mg Intravenous $Given 2/13/24 2112)   gadobutrol (GADAVIST) injection 7 mL (7 mLs Intravenous $Given 2/13/24 2057)   sodium chloride (PF) 0.9% PF flush 10 mL (10 mLs Intravenous $Given 2/13/24 2057)     "  Independent Interpretation (X-rays, CTs, rhythm strip):  On my independent review of CT head, there is a well-circumscribed hyperdensity near the right cerebellar region.    Assessments/Consultations/Discussion of Management or Tests:  ED Course as of 02/14/24 0302   Tue Feb 13, 2024 1823 I obtained history and examined the patient as noted above.    1850 I consulted with Dr. Harkins from stroke neuro who reviewed CT imaging and recommend SBP goal of <140 given abnormal CT finding of well circumscribed hyperdensity.   1850 CT Head w/o Contrast  On my independent review of CT head, there is a well-circumscribed hyperdensity near the right cerebellar region.   1909 EKG 12 lead  Normal sinus rhythm.  Rate of 74.  Normal [place] and QRS.  Normal QTc.  No acute ST elevation or depression as compared with 2/1/2024 EKG.   1911 I spoke with [facility]. Notified about the aneurysm. They recommend MRI/MRA.   1916 I rechecked and updated the patient and his wife on the CT results.    1932 I consulted with Dr. Lugo from stroke neuro.   2007 Per stroke neuro Dr. Lugo via Epic chat: \"Spoke with Neuro IR: Plan: Admit to inpatient qith q4h SBP goal <140 MRI and MRA with fat sat (orders updated) Marina discussing with Dr Bates, may plan for inpatient angio \"   2007 Hum: \"NPO midnight, angio tomorrow at 1 pm\"   2144 I updated the patient and answered questions.   2147 I spoke with hospitalist Dr. Vergara regarding admission.   2209 Neurosurgery notified regarding MRI findings of dissecting aneurysm.  They will review images for further recommendations and discussed with neuro IR.   2232 Updated by stroke neurology who reviewed imaging with neuro IR.  Suspect findings on aneurysm dissection may be slow and chronic in nature.  Patient may be throwing small embolus causing transient symptoms.  Stroke neurology will place aspirin order, but no change in plan at this time.  Still plan for angiography tomorrow.  Dr. Vergara aware and " will update patient on plan.     Social Determinants of Health affecting care:   None    Disposition:  The patient was admitted to the hospital under the care of Dr. Vergara.     Impression & Plan    Medical Decision Makin-year-old male as described above presents to the emergency department for transient double vision x 2 episode and now has a mild lingering left-sided headache.  Patient hemodynamically stable at time evaluation.  Afebrile.  Double vision symptoms have resolved.  Patient states that when the episode occurs, the last roughly 5 minutes each episode and he had trouble focusing his vision.  No prior history of migraine.  No prior similar history.  No family history of stroke.  No personal history of stroke.  NIHSS score of 0 at this time.  Wife states that patient had disconjugate gaze during her eye examination during the episode, but at my time of examination, patient has normal extraocular movements with conjugate gaze.  No further diplopia.  Cranial nerves intact at this time.  Differential diagnosis considered includes, but not limited to ophthalmic migraine, complex migraine, CVA/TIA, and intracranial mass.  Less likely cerebral venous thrombosis as symptoms now resolved, similar with intracranial mass.  Nonetheless, will obtain MRI brain with and without contrast.  Stroke neurology consultation.  Discussed care plan with patient who voiced understanding and agreement with plan.  Answered all questions.  Additional workup and orders as listed in chart.     Please refer to ED course above as part of continuation of MDM for details on the patient's treatment course and any changes or updates in care plan beyond my initial evaluation and MDM creation.    Diagnosis:    ICD-10-CM    1. Diplopia  H53.2       2. Cerebral aneurysm  I67.1          Discharge Medications:  New Prescriptions    No medications on file      Scribe Disclosure:  KALA TITUS, am serving as a scribe at 6:11 PM on 2024  to document services personally performed by Pradip Carey DO based on my observations and the provider's statements to me.     2/13/2024   Pradip Carey DO Yeh, Ferris, DO  02/14/24 0307     None

## 2024-02-14 NOTE — ED NOTES
[facility]    ED Nurse Handoff Report    ED Chief complaint: Eye Problem and Vision Loss      ED Diagnosis:   Final diagnoses:   None       Code Status: Full Code    Allergies: No Known Allergies    Patient Story:  Pt comes into ER for complaints of several episodes of sudden blurry vision and gait instability since noon today. CT shows aneurism. In between episodes of the blurry vision, patient is neurologically intact.     Focused Assessment:    Pt is A&Ox4, independent, can make needs known. Pt has no neuro deficits in between episodes, no pain, no HA. BP has been elevated SBP in 150s, DBP in 110s. Pt will have repeat CT at 2300, NPO at midnight, Angio at 1PM tomorrow. Goal of SBP <140 systolic.     Labs Ordered and Resulted from Time of ED Arrival to Time of ED Departure   BASIC METABOLIC PANEL - Abnormal       Result Value    Sodium 139      Potassium 5.0      Chloride 104      Carbon Dioxide (CO2) 27      Anion Gap 8      Urea Nitrogen 13.4      Creatinine 0.86      GFR Estimate >90      Calcium 9.6      Glucose 106 (*)    TROPONIN T, HIGH SENSITIVITY - Normal    Troponin T, High Sensitivity <6     CBC WITH PLATELETS AND DIFFERENTIAL    WBC Count 7.5      RBC Count 4.97      Hemoglobin 14.9      Hematocrit 43.1      MCV 87      MCH 30.0      MCHC 34.6      RDW 11.9      Platelet Count 300      % Neutrophils 45      % Lymphocytes 42      % Monocytes 8      % Eosinophils 4      % Basophils 1      % Immature Granulocytes 0      NRBCs per 100 WBC 0      Absolute Neutrophils 3.4      Absolute Lymphocytes 3.1      Absolute Monocytes 0.6      Absolute Eosinophils 0.3      Absolute Basophils 0.0      Absolute Immature Granulocytes 0.0      Absolute NRBCs 0.0         CTA Head Neck with Contrast   Final Result   Addendum (preliminary) 1 of 1   Dr. Carey was contacted by me on 2/13/2024 7:13 PM CST.      Final   IMPRESSION:    HEAD CT:   1.  No acute intracranial process.   2.  13 mm hyperdensity in the  right premedullary cistern with mild mass effect on the medulla, described in the CTA section below.      HEAD CTA:    1.  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 Contrast enhanced brain MRI and MRA head recommended to further characterize.   2.  No large vessel occlusion or hemodynamically significant stenosis.      NECK CTA:   1.  No large vessel occlusion or hemodynamically significant stenosis.      CT Head w/o Contrast   Final Result   Addendum (preliminary) 1 of 1   Dr. Carey was contacted by me on 2/13/2024 7:13 PM CST.      Final   IMPRESSION:    HEAD CT:   1.  No acute intracranial process.   2.  13 mm hyperdensity in the right premedullary cistern with mild mass effect on the medulla, described in the CTA section below.      HEAD CTA:    1.  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 Contrast enhanced brain MRI and MRA head recommended to further characterize.   2.  No large vessel occlusion or hemodynamically significant stenosis.      NECK CTA:   1.  No large vessel occlusion or hemodynamically significant stenosis.      MR Brain w/o & w Contrast    (Results Pending)   MRA Angiogram Head w/o Contrast    (Results Pending)         Treatments and/or interventions provided:      Medications   labetalol (NORMODYNE/TRANDATE) injection 10 mg (has no administration in time range)   sodium chloride 0.9% BOLUS 1,000 mL (0 mLs Intravenous Stopped 2/13/24 1919)   iopamidol (ISOVUE-370) solution 67 mL (67 mLs Intravenous $Given 2/13/24 1808)   sodium chloride 0.9 % bag 500 mL for CT scan flush use (90 mLs Intravenous $Given 2/13/24 1808)       Patient's response to treatments and/or interventions:    Pt resting in bed.     To be done/followed up on inpatient unit:   See any in-patient orders    Does this patient have any cognitive concerns?: none    Activity level - Baseline/Home:    Independent    Activity Level - Current:    Independent    Patient's  Preferred language: English     Needed?: No    Isolation: None  Infection: Not Applicable  Patient tested for COVID 19 prior to admission: NO    Bariatric?: No    Vital Signs:   Vitals:    02/13/24 1931 02/13/24 1945 02/13/24 1946 02/13/24 2001   BP: (!) 153/111 (!) 149/107  (!) 150/107   Pulse: 83 80     Resp:       Temp:       TempSrc:       SpO2: 97%  96%    Weight:       Height:           Cardiac Rhythm:     Was the PSS-3 completed:   Yes  What interventions are required if any?               Family Comments: Wife at bedside  OBS brochure/video discussed/provided to patient/family: N/A              Name of person given brochure if not patient:               Relationship to patient:     For the majority of the shift this patient's behavior was Green.  Behavioral interventions performed were .    ED NURSE PHONE NUMBER: [phone]

## 2024-02-14 NOTE — PLAN OF CARE
PT: Orders received. Chart reviewed and discussed with care team. Per conversation with nurse, pt is IND in the room, stable with mobility. No needs for IP PT. Will defer discharge recommendations to medical team.  Will complete  orders.

## 2024-02-14 NOTE — PROGRESS NOTES
[facility]     Endovascular Surgical Neuroradiology Pre-Procedure Note      HPI:  Silas Lima is a 48 year old male with PMH of HTN presenting with sudden onset vertigo and double vision with dysconjugate gaze that has now completely resolved. In ED, he obtained CTH/CTA which demonstrated a 13 mm thrombosed aneurysm of the right V4 near the origin of the PICA.     Patient works for [employer] as an . He has been suffering from neck pain since 2021 June after he travelled from [facility]. In 2021 his neck pain was severe and sought treatment including chiropractic neck manipulation and massage. Currently, he only has mild neck pain. No other focal neurological deficits detected on examination. The risks, benefits and the alternatives of the diagnostic cerebral angiogram was reviewed with patient and his wife. They agreed to undergo the procedure. Both written and verbal consent was obtained.     Medical History:  HTN    Surgical History:  No past surgical history on file.    Family History:  Family History   Problem Relation Age of Onset    C.A.D. Father     C.A.D. Paternal Grandfather     Heart Disease Father     Heart Disease Paternal Grandfather     Diabetes Father     Diabetes Maternal Grandmother     Diabetes Paternal Grandfather     Hypertension Paternal Grandmother     Hypertension Paternal Grandfather     Cerebrovascular Disease Paternal Grandfather     Lipids Paternal Grandfather        Social History:  Social History     Socioeconomic History    Marital status:      Spouse name: Not on file    Number of children: 0    Years of education: Not on file    Highest education level: Not on file   Occupational History    Occupation:       Employer: [employer]   Tobacco Use    Smoking status: Never    Smokeless tobacco: Not on file   Substance and Sexual Activity    Alcohol use: Yes     Comment: 1-2 per wk    Drug use: No    Sexual activity: Yes     Partners: Female    Other Topics Concern    Not on file   Social History Narrative    Not on file     Social Determinants of Health     Financial Resource Strain: Not on file   Food Insecurity: Not on file   Transportation Needs: Not on file   Physical Activity: Not on file   Stress: Not on file   Social Connections: Not on file   Interpersonal Safety: Not on file   Housing Stability: Not on file       Allergies:  No Known Allergies    Is there a contrast allergy?  No    Medications:  Medications Prior to Admission   Medication Sig Dispense Refill Last Dose    amLODIPine (NORVASC) 5 MG tablet Take 5 mg by mouth daily   2/13/2024 at AM   .    ROS:  The 10 point Review of Systems is negative other than noted in the HPI or here.     PHYSICAL EXAMINATION  Vital Signs:  B/P: 116/87,  T: 98.7,  P: 69,  R: 16    Cardio:  RRR  Pulmonary:  no respiratory distress  Abdomen:  soft, non-tender, non-distended    Neurologic  Mental Status:  fully alert, attentive and oriented, follows commands, speech clear and fluent  Cranial Nerves:  visual fields intact, PERRL, EOMI with normal smooth pursuit, facial sensation intact and symmetric, facial movements symmetric, hearing not formally tested but intact to conversation, palate elevation symmetric and uvula midline, no dysarthria, shoulder shrug strong bilaterally, tongue protrusion midline  Motor:  no abnormal movements, normal tone throughout, normal muscle bulk, no pronator drift, normal and symmetric rapid finger tapping, able to move all limbs spontaneously, strength 5/5 throughout upper and lower extremities  Sensory:  intact/symmetric to light touch and pin prick throughout upper and lower extremities  Coordination:  FNF and HS intact without dysmetria    Pre-procedure National Institutes of Health Stroke Scale:   Not applicable    LABS  (most recent Cr, BUN, GFR, PLT, INR, PTT within the past 7 days):  Recent Labs   Lab 02/14/24  0705   CR 0.94   BUN 10.6   GFRESTIMATED >90         Platelet Function P2Y12 (PRU):  Not applicable    ASSESSMENT:    Right Vertebral Artery Dissecting Fusiform Aneurysm    PLAN:     - Diagnostic Cerebral Angiogram  - Radial or Femoral Access  - Moderate Sedation  - Use of Closure Device     PRE-PROCEDURE SEDATION ASSESSMENT     Pre-Procedure Sedation Assessment done at 0900.    Expected Level:  Moderate Sedation    Indication:  Sedation is required to allow for neurointerventional procedure.    Consent obtained from patient after discussing the risks, benefits and alternatives.     PO Intake:  Appropriately NPO for procedure    ASA Class:  Class 2 - MILD SYSTEMIC DISEASE, NO ACUTE PROBLEMS, NO FUNCTIONAL LIMITATIONS.    Mallampati:  Grade 1:  Soft palate, uvula, tonsillar pillars, and posterior pharyngeal wall visible    History and physical reviewed and no updates needed. I have reviewed the lab findings, diagnostic data, medications, and the plan for sedation. I have determined this patient to be an appropriate candidate for the planned sedation and procedure and have reassessed the patient IMMEDIATELY PRIOR to sedation and procedure.  Patient was discussed with the Attending, Dr. Royal, who agrees with the plan.    Toby Mcgraw MD, MPH  Neuroendovascular Surgery Fellow  [facility]  Pager: [phone]

## 2024-02-14 NOTE — PHARMACY-CONSULT NOTE
Pharmacy Consult to evaluate for medication related stroke core measures    Silas Lima, 48 year old male admitted for right thrombosed vertebral artery dissecting aneurysm on 2/13/2024.    Thrombolytic was not given because of Clinical contraindications    VTE Prophylaxis SCDs /PCDs placed on 2/14/24, as appropriate prior to end of hospital day 2.    Antithrombotic: aspirin started on 2/13/24, as appropriate by end of hospital day 2. Continue antithrombotic therapy on discharge to meet quality measures, unless contraindicated.    Anticoagulation if history of A-fib/flutter: Patient does not have history of A-fib/flutter - anticoagulation not required for medication related stroke core measures.     LDL Cholesterol Calculated   Date Value Ref Range Status   02/14/2024 125 (H) <=100 mg/dL Final       Patient does not currently have a statin ordered.    Recommendations:  Consider statin based on .    Thank you for the consult.    Thu [facility] 2/14/2024 2:28 PM

## 2024-02-14 NOTE — PHARMACY-ADMISSION MEDICATION HISTORY
Pharmacist Admission Medication History    Admission medication history is complete. The information provided in this note is only as accurate as the sources available at the time of the update.    Information Source(s): Family member, Hospital records, and CareEverywhere/SureScripts via in-person    Pertinent Information: None    Changes made to PTA medication list:  Added: None  Deleted: None  Changed: None    Medication History Completed By: Thomas Gama RPH 2/13/2024 8:42 PM    PTA Med List   Medication Sig Last Dose    amLODIPine (NORVASC) 5 MG tablet Take 5 mg by mouth daily 2/13/2024 at AM

## 2024-02-14 NOTE — CONSULTS
"  [facility]    Stroke Telephone Note    I was called by Pradip Carey on 02/13/24 regarding patient Silas Lima. The patient is a 48 year old man with h/o HTN who was working from home on his computer around 12 noon when he suddenly had a light headed sensation and double vision x 5 minutes. Wife noted that his gaze was lagging or dysconjugate during this time. He again had another episode in the afternoon that was similar to prior but was transient. Exam at ED is non focal.    Vitals  BP: (!) 155/112   Pulse: 80   Resp: 16   Temp: 97.8  F (36.6  C)   Weight: 68 kg (150 lb)    Imaging Findings  CT head: 13 mm hyperdensity in the right premedullary cistern with mild mass effect on the medulla   CTA head and Neck: Suspected 13 mm thrombosed right V4 aneurysm, likely at the PICA origin.         Recommendations  - Initially thought to be pontine hemorrhage but radiology has assessed it as to be thrombosed R V4 aneurysm. Will touch base with Neuro IR for further recommendations.    Discussed with Dr. Tomasa Harkins MD   Vascular Neurology Fellow      My recommendations are based on the information provided over the phone by Silas Lima's in-person providers. They are not intended to replace the clinical judgment of his in-person providers. I was not requested to personally see or examine the patient at this time.       To page me or covering stroke neurology team member, click here: AMCOM  Choose \"On Call\" tab at top, then select \"NEUROLOGY/ALL SITES\" from middle drop-down box, press Enter, then look for \"stroke\" or \"telestroke\" for your site.   "

## 2024-02-14 NOTE — IR NOTE
Interventional Radiology Intra-procedural Nursing Note    Patient Name: Silas Lima  Medical Record Number: [id number]  Today's Date: February 14, 2024    Procedure consented for : Diagnostic Cerebral angiogram with moderate sedation  Start time: 1344  End time: 1419  Report provided to: ST Campbell RN  Patient depart time and location: 1430 to 039    Note: Patient entered Interventional Radiology Suite number 3 via cart. Patient awake, alert and oriented. Assisted onto procedural table in supine position. Prepped and draped.  Dr. Lucien Hu and Dr Royal in room. Time out and procedure started. Vital signs stable. Telemetry reading SR.    Procedure well tolerated by patient without complications. Procedure end with debrief by Dr. Dr. Lucien Hu and Dr Royal.  Pressure held until hemostasis achieved. Angio seal and quick clot with tegaderm dressing applied to right groin.    4 hours bedrest.    Administered medication totals:  Lidocaine 1% 20 mL Intradermal  Heparin 5000 Units IV  Versed 2 mg IVP  Fentanyl 100 mcg IVP    Last dose of sedation administered at 1405.

## 2024-02-14 NOTE — PROCEDURES
[facility]     Endovascular Surgical Neuroradiology Post-Procedure Note    Pre-Procedure Diagnosis:  Right V4 segment dissecting thrombosed aneurysm  Post-Procedure Diagnosis:  same    Procedure(s):   Diagnostic cervicocerebral angiography    Findings:  There is evidence of intradural right vertebral artery dissecting, mostly thrombosed aneurysm that is not clearly visible due to thrombosis. There is a fusiform component that is filling in the V4 segment  There is good supply to the basilar artery from the left vertebral artery    Plan:  Bed rest for 4 hours  Would recommend treating this aneurysm most likely with flow diverter, would recommend starting patient on DAPT no need for loading dose aspirin 81 mg and Plavix 75 mg    Primary Surgeon:  Dr. Larry Royal  Secondary Surgeon:  Not applicable  Secondary Surgeon Review:  None  Fellow:  Fritz Mcgraw  Additional Assistants:  none    Prior to the start of the procedure and with procedural staff participation, I verbally confirmed: the patient s identity using two indicators, relevant allergies, that the procedure was appropriate and matched the consent or emergent situation, and that the correct equipment/implants were available. Immediately prior to starting the procedure I conducted the Time Out with the procedural staff and re-confirmed the patient s name, procedure, and site/side. (The Joint Commission universal protocol was followed.)  Yes    PRU value: Not applicable    Anesthesia:  Conscious Sedation  Medications:  Lidocaine 1% 20 mL Intradermal  Heparin 5000 Units IV  Versed 2 mg IVP  Fentanyl 100 mcg IVP  Puncture site:  right femoral    Fluoroscopy time (minutes):  10  Radiation dose (mGy):  292  Contrast amount (mL):  40    Estimated blood loss (mL):  20    Closure:  6 F angioseal closure device    Disposition:  Home after recovery.        Sedation Post-Procedure Summary    Sedatives: Lidocaine 1% 20 mL  Intradermal  Heparin 5000 Units IV  Versed 2 mg IVP  Fentanyl 100 mcg IVP    Vital signs and pulse oximetry were monitored and remained stable throughout the procedure, and sedation was maintained until the procedure was complete.  The patient was monitored by staff until sedation discharge criteria were met.    Patient tolerance:  Patient tolerated the procedure well with no immediate complications.    Time of sedation in minutes:  30 minutes from beginning to end of physician one to one monitoring.    Coral uH MD   Neuroendovascular Surgical Neuroradiology Fellow  Pager: [phone]    See official report in PACS  AROLDO Royal MD

## 2024-02-14 NOTE — PLAN OF CARE
"Neurology Update:    48 year old man with h/o HTN who was working from home on his computer around 12 noon when he suddenly had a light headed sensation and double vision x 5 minutes. Wife noted that his gaze was lagging or dysconjugate during this time. He again had another episode in the afternoon that was similar to prior but was transient. Exam at ED is non focal.    Imaging Findings  CT head: 13 mm hyperdensity in the right premedullary cistern with mild mass effect on the medulla   CTA head and Neck: Suspected 13 mm thrombosed right V4 aneurysm, likely at the PICA origin.     Discussed with Dr Castaneda from Neuro IR. Likely a large aneurysm that is partially thrombosed with either growth or inflammation from clot maturation causing local mass effect    Plan:  Admit to inpatient with neuro check q4h  SBP goal <140  MRI and MRA with fat sat (orders updated)  NPO midnight, plan for angio inpatient around 1 pm.     Addendum:10:28 pm    MRI/MRA completed, no evidence of brainstem edema, likely no acute change in size. The episode could be thromboembolic phenomenon. Discussed with Neuro IR, plan to start on aspirin 81 mg.    Stroke Neurology team will continue to follow    Stroke Staff is Dr Tomasa Lugo MD  Vascular Neurology Fellow     To page me or covering stroke neurology team member, click here: AMCOM  Choose \"On Call\" tab at top, then select \"NEUROLOGY/ALL SITES\" from middle drop-down box, press Enter, then look for \"stroke\" or \"telestroke\" for your site.    "

## 2024-02-14 NOTE — PLAN OF CARE
Reason for Admission: double vision, vertebral artery aneurysm     Cognitive/Mentation: A/Ox 4  Neuros/CMS: Intact   VS: stable on RA, SBP <140.   Tele: NSR.  GI: Last BM pta. Continent.  : voiding without difficutly. Continent.  Pulmonary: LS clear.  Pain: mild HA rated 1/10, PRN tylenol given x1.     Drains/Lines: L PIV infusing NS @100ml/hr  Skin: intact  Activity: independent  Diet: NPO since 0000, takes pills whole with water    Therapies recs: pending  Discharge: pending    Aggression Stoplight Tool: green    End of shift summary: plan for cerebral angio today @1pm

## 2024-02-14 NOTE — H&P
[facility]    History and Physical - Hospitalist Service       Date of Admission:  2/13/2024    Assessment & Plan      Silas Lima is a 48 year old male admitted on 2/13/2024. He presents to the emergency department for evaluation of stuttering double vision, wife noted some medial deviation of the left eye during his episodes.  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  Possible recurrent thrombotic events with partially thrombosed aneurysm  -Aspirin 81 mg daily, no heparin per neurology.  -Stroke neurology as well as neurointerventional consulted, aware of patient from emergency department  -N.p.o. after midnight; per stroke neurology and neurointerventional, plan is for cerebral angiogram at 1 PM.  -Goal systolic blood pressure less than 140.  As needed labetalol and hydralazine available to maintain this  -Increasing prior to admission amlodipine from 5 mg daily to 10 mg daily  -Normal saline 100/h  -Vital signs and neurochecks every 4 hours; if recurrence of symptoms, may need to advance to neuro IMC status.  Discussed with patient.    Hypertension:  -Increasing prior to admission amlodipine from 5 mg daily to 10 mg daily.  -Low threshold for addition of carvedilol for improved blood pressure control if needed    Hyperlipidemia: History of significantly elevated LDL historically, last in 2018.  Not on statin therapy  -Lipid panel ordered; pending [place] risk assessment recalculation with updated lipid panel, anticipate initiation of statin therapy.  Discussed with patient on admission.  Based on 2018 lipid panel with systolic blood pressure of 150, would be 11% 10-year risk.          Diet: NPO for Medical/Clinical  Reasons Except for: No Exceptions  NPO per Anesthesia Guidelines for Procedure/Surgery Except for: Meds    DVT Prophylaxis: Pneumatic Compression Devices  [place] Catheter: Not present  Lines: None     Cardiac Monitoring: None  Code Status:  Full code.  Confirmed with patient on admission    Clinically Significant Risk Factors Present on Admission                                  Disposition Plan      Expected Discharge Date: 02/15/2024                  Wilfredo Vergara MD  Hospitalist Service  [facility]  Securely message with [employer] (more info)  Text page via trend[employer]ly Paging/Directory     ______________________________________________________________________    Chief Complaint   Intermittent double vision    History is obtained from the patient, patient's wife at bedside, chart review, discussion with Dr. Carey in the emergency department.    History of Present Illness   Silas Lima is a 48 year old male who presents to the emergency department after several brief episodes of double vision, medial drift of left eye earlier today.      Today, at approximately noon while working on a computer, patient had a transient episode of double vision.  This lasted approximately 5 minutes before resolving.  At this episode were potentially the second episode, his wife noted that he had some medial drift of his left eye during this event.    Patient had a second episode around 2 PM, again lasting 5 minutes.  Called into the nurse line and recommendation was to be evaluated.  As patient's symptoms were very brief, and his children were about to come home from school, they waited a bit, and thought they might be able to follow-up as symptoms did not return.  When symptoms did return around 3:30 PM or so, they presented to the emergency department for evaluation.    Symptoms were resolved initially on arrival, though he does report that shortly after arrival he did have some slight peripheral vision changes on  the left which resolved rapidly.  Reported a slight left-sided forehead headache today, but this is described as a very minimal component of his symptoms.  Does not typically get headaches.    Imaging obtained concerning for right V4 partially thrombosed aneurysm.  MRI/MRA confirmed this, with concern for dissecting aneurysm resulting in this finding.    Both stroke neurology and neurointerventional radiology were made aware of patient from the emergency department.  Plan was for cerebral angiogram 2/14 at 1 PM.    Following MRA finding with concern for dissection, stroke neurology was again contacted and patient initiated on aspirin therapy.  Results were discussed with patient as well as his wife at bedside.    Silas is typically healthy, but has a history of hypertension for which he has been on amlodipine for the past few years.     In June 2022, patient had a viral illness with trapezius and strap muscle tenderness, low-grade fevers in the 100 range, headaches, some right ear pain.  Reports he had low-grade temperatures which persisted several weeks, no clear identification of this illness was made other than presumed viral illness.  COVID test negative x 5.  Illness lasted approximately a month before resolving.    It was around the time of his viral illness that he was initiated on amlodipine for hypertension.  He had some intermittent tachycardia with amlodipine, with activity, and was subsequently switched to metoprolol.  This did not control his blood pressure as well, but he did have improvement in his heart rate.  Switched back to amlodipine as it was thought perhaps that his tachycardia was related to the above-mentioned viral illness rather than side effect from amlodipine.  Once switched back, he tells me that his heart rate was not in the 60s as it was on metoprolol, but had a resting heart rate in the 70s range.  Reports that his blood pressure now on 5 mg of amlodipine daily is typically in the  130 systolic range.    Reports no family history of stroke or cerebral aneurysm.  No family history of polycystic kidney disease or renal failure that he is aware of.  Father with diabetes and a myocardial infarction in his 60s.  Mother with hypertension.      Past Medical History    Hypertension    Past Surgical History   No past surgical history    Prior to Admission Medications   Prior to Admission Medications   Prescriptions Last Dose Informant Patient Reported? Taking?   amLODIPine (NORVASC) 5 MG tablet 2/13/2024 at AM  Yes Yes   Sig: Take 5 mg by mouth daily      Facility-Administered Medications: None           Physical Exam   Vital Signs: Temp: 97.8  F (36.6  C) Temp src: Temporal BP: (!) 145/116 Pulse: 75   Resp: 13 SpO2: 98 % O2 Device: None (Room air)    Weight: 150 lbs 0 oz    General Appearance: Well-appearing 48-year-old male in no acute distress  Eyes: No scleral icterus or injection.  Extraocular motions are intact and equal.  Tracks appropriately.  HEENT: Normocephalic and atraumatic  Respiratory: Breath sounds pristine bilaterally to auscultation without wheezes or crackles.  Cardiovascular: Regular rate and rhythm, no murmur  GI: Abdomen soft, nontender palpation.  No palpable mass.  Lymph/Hematologic: No lower extremity edema  Skin: No rash or jaundice  Musculoskeletal: Muscular tone and bulk intact in all extremities and appropriate for age.  Neurologic: Cranial nerves intact.  Rapid finger tap and rapid alternating movements intact and equal bilaterally.   strength, resistance against flexion and extension at wrist and elbow intact and equal bilaterally.  Plantar and dorsiflexion intact and equal bilaterally.  5 out of 5 hip flexors bilaterally.  No focal neurologic deficits are appreciated.  Psychiatric: Very pleasant, normal affect    Medical Decision Making       70 MINUTES SPENT BY ME on the date of service doing chart review, history, exam, documentation & further activities per the  note.      Data     I have personally reviewed the following data over the past 24 hrs:    7.5  \   14.9   / 300     139 104 13.4 /  106 (H)   5.0 27 0.86 \     Trop: <6 BNP: N/A       Imaging results reviewed over the past 24 hrs:   Recent Results (from the past 24 hour(s))   CT Head w/o Contrast    Addendum: 2/13/2024    Dr. Carey was contacted by me on 2/13/2024 7:13 PM CST.      Narrative    EXAM: CT HEAD W/O CONTRAST, CTA HEAD NECK W CONTRAST  LOCATION: [facility]  DATE/TIME: 2/13/2024 6:25 PM CST    INDICATION: Intermittent double vision and dizziness  COMPARISON: None.  CONTRAST: 67 mL Isovue 370  TECHNIQUE: Head and neck CT angiogram with IV contrast. Noncontrast head CT followed by axial helical CT images of the head and neck vessels obtained during the arterial phase of intravenous contrast administration. Axial 2D reconstructed images and   multiplanar 3D MIP reconstructed images of the head and neck vessels were performed by the technologist. Dose reduction techniques were used. All stenosis measurements made according to NASCET criteria unless otherwise specified.    FINDINGS:   NONCONTRAST HEAD CT:   INTRACRANIAL CONTENTS: 13 mm hyperdensity in the right premedullary cistern with mild mass effect on the medulla (series 3, image 5). No intracranial hemorrhage, extraaxial collection, or midline shift.  No CT evidence of acute infarct. Normal   parenchymal attenuation. Normal ventricles and sulci.     VISUALIZED ORBITS/SINUSES/MASTOIDS: No intraorbital abnormality. No significant paranasal sinus mucosal disease. No middle ear or mastoid effusion.    BONES/SOFT TISSUES: No acute abnormality.    HEAD CTA:  ANTERIOR CIRCULATION: No stenosis/occlusion, aneurysm, or high flow vascular malformation. Fetal origin of both posterior cerebral arteries from the anterior circulation.    POSTERIOR CIRCULATION: Mild luminal irregularity of the right V4 abutting the hyperdensity in the right  premedullary cistern on corresponding noncontrast CT head. The V4 appears to slightly wraparound the rounded hyperdensity (series 5, image 113). The   rounded hyperdensity is nonopacified. No significant stenosis. No occlusion or high flow vascular malformation.      DURAL VENOUS SINUSES: Expected enhancement of the major dural venous sinuses.    NECK CTA:  RIGHT CAROTID: No measurable stenosis or dissection.    LEFT CAROTID: No measurable stenosis or dissection.    VERTEBRAL ARTERIES: No focal stenosis or dissection. Balanced vertebral arteries.    AORTIC ARCH: Classic aortic arch anatomy with no significant stenosis at the origin of the great vessels.    NONVASCULAR STRUCTURES: Unremarkable.      Impression    IMPRESSION:   HEAD CT:  1.  No acute intracranial process.  2.  13 mm hyperdensity in the right premedullary cistern with mild mass effect on the medulla, described in the CTA section below.    HEAD CTA:   1.  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 Contrast enhanced brain MRI and MRA head recommended to further characterize.  2.  No large vessel occlusion or hemodynamically significant stenosis.    NECK CTA:  1.  No large vessel occlusion or hemodynamically significant stenosis.   CTA Head Neck with Contrast    Addendum: 2/13/2024    Dr. Carey was contacted by me on 2/13/2024 7:13 PM CST.      Narrative    EXAM: CT HEAD W/O CONTRAST, CTA HEAD NECK W CONTRAST  LOCATION: [facility]  DATE/TIME: 2/13/2024 6:25 PM CST    INDICATION: Intermittent double vision and dizziness  COMPARISON: None.  CONTRAST: 67 mL Isovue 370  TECHNIQUE: Head and neck CT angiogram with IV contrast. Noncontrast head CT followed by axial helical CT images of the head and neck vessels obtained during the arterial phase of intravenous contrast administration. Axial 2D reconstructed images and   multiplanar 3D MIP reconstructed images of the head and neck vessels were performed by the  technologist. Dose reduction techniques were used. All stenosis measurements made according to NASCET criteria unless otherwise specified.    FINDINGS:   NONCONTRAST HEAD CT:   INTRACRANIAL CONTENTS: 13 mm hyperdensity in the right premedullary cistern with mild mass effect on the medulla (series 3, image 5). No intracranial hemorrhage, extraaxial collection, or midline shift.  No CT evidence of acute infarct. Normal   parenchymal attenuation. Normal ventricles and sulci.     VISUALIZED ORBITS/SINUSES/MASTOIDS: No intraorbital abnormality. No significant paranasal sinus mucosal disease. No middle ear or mastoid effusion.    BONES/SOFT TISSUES: No acute abnormality.    HEAD CTA:  ANTERIOR CIRCULATION: No stenosis/occlusion, aneurysm, or high flow vascular malformation. Fetal origin of both posterior cerebral arteries from the anterior circulation.    POSTERIOR CIRCULATION: Mild luminal irregularity of the right V4 abutting the hyperdensity in the right premedullary cistern on corresponding noncontrast CT head. The V4 appears to slightly wraparound the rounded hyperdensity (series 5, image 113). The   rounded hyperdensity is nonopacified. No significant stenosis. No occlusion or high flow vascular malformation.      DURAL VENOUS SINUSES: Expected enhancement of the major dural venous sinuses.    NECK CTA:  RIGHT CAROTID: No measurable stenosis or dissection.    LEFT CAROTID: No measurable stenosis or dissection.    VERTEBRAL ARTERIES: No focal stenosis or dissection. Balanced vertebral arteries.    AORTIC ARCH: Classic aortic arch anatomy with no significant stenosis at the origin of the great vessels.    NONVASCULAR STRUCTURES: Unremarkable.      Impression    IMPRESSION:   HEAD CT:  1.  No acute intracranial process.  2.  13 mm hyperdensity in the right premedullary cistern with mild mass effect on the medulla, described in the CTA section below.    HEAD CTA:   1.  Suspected 13 mm thrombosed right V4 aneurysm,  likely at the PICA origin. Contrast enhanced brain MRI and MRA head recommended to further characterize.  2.  No large vessel occlusion or hemodynamically significant stenosis.    NECK CTA:  1.  No large vessel occlusion or hemodynamically significant stenosis.   MR Brain w/o & w Contrast    Narrative    EXAM: MR BRAIN W/O and W CONTRAST, MRA BRAIN ([place]) W/O CONTRAST  LOCATION: [facility]  DATE: 2/13/2024    INDICATION: Transient double vision and dysconjugate gaze. Evaluate for CVA/TIA mass.  COMPARISON: CT/CTA same day  CONTRAST: 7 mL Gadavist  TECHNIQUE:   1) Routine multiplanar multisequence head MRI without and with intravenous contrast.  2) 3D time-of-flight head MRA without intravenous contrast.    FINDINGS:  HEAD MRI:  INTRACRANIAL CONTENTS: No acute or subacute infarct. Corresponding to the lesion demonstrated on the recent head CT/CTA, there is a 13 mm in diameter lesion which appears to be intimately associated with the distal right vertebral artery flow void. It   demonstrates heterogeneous enhancement on postcontrast imaging as well as blooming on the GRE sequence. Appearance is most consistent with a partially thrombosed aneurysm. Normal brain parenchymal signal. Normal ventricles and sulci. Normal position of   the cerebellar tonsils. No additional pathologic enhancement.    SELLA: No abnormality accounting for technique.    OSSEOUS STRUCTURES/SOFT TISSUES: Normal marrow signal. The major intracranial vascular flow voids are maintained.     ORBITS: No abnormality accounting for technique.     SINUSES/MASTOIDS: No paranasal sinus mucosal disease. No middle ear or mastoid effusion.     HEAD MRA:   ANTERIOR CIRCULATION: No stenosis/occlusion, aneurysm, or high flow vascular malformation. Standard [place] anatomy.    POSTERIOR CIRCULATION: Fusiform dilatation of the intracranial right vertebral artery relating to the above-described partially thrombosed 13  mm aneurysm. Dominant right and smaller left vertebral artery contribute to a normal basilar artery.       Impression    IMPRESSION:  HEAD MRI:   1.  Corresponding to the abnormality on the recent head CT/CTA, there is evidence of a 13 mm diameter partially thrombosed aneurysm associated with the intracranial right vertebral artery.  2.  The intracranial contents are otherwise unremarkable.    HEAD MRA:   1.  13 mm partially thrombosed presumed dissecting aneurysm associated with the V4 segment of the right vertebral artery.    NOTE: ABNORMAL REPORT    THE DICTATION ABOVE DESCRIBES AN ABNORMALITY FOR WHICH FOLLOW-UP IS NEEDED.    MRA Angiogram Head w/o Contrast    Narrative    EXAM: MR BRAIN W/O and W CONTRAST, MRA BRAIN ([place]) W/O CONTRAST  LOCATION: [facility]  DATE: 2/13/2024    INDICATION: Transient double vision and dysconjugate gaze. Evaluate for CVA/TIA mass.  COMPARISON: CT/CTA same day  CONTRAST: 7 mL Gadavist  TECHNIQUE:   1) Routine multiplanar multisequence head MRI without and with intravenous contrast.  2) 3D time-of-flight head MRA without intravenous contrast.    FINDINGS:  HEAD MRI:  INTRACRANIAL CONTENTS: No acute or subacute infarct. Corresponding to the lesion demonstrated on the recent head CT/CTA, there is a 13 mm in diameter lesion which appears to be intimately associated with the distal right vertebral artery flow void. It   demonstrates heterogeneous enhancement on postcontrast imaging as well as blooming on the GRE sequence. Appearance is most consistent with a partially thrombosed aneurysm. Normal brain parenchymal signal. Normal ventricles and sulci. Normal position of   the cerebellar tonsils. No additional pathologic enhancement.    SELLA: No abnormality accounting for technique.    OSSEOUS STRUCTURES/SOFT TISSUES: Normal marrow signal. The major intracranial vascular flow voids are maintained.     ORBITS: No abnormality accounting for technique.      SINUSES/MASTOIDS: No paranasal sinus mucosal disease. No middle ear or mastoid effusion.     HEAD MRA:   ANTERIOR CIRCULATION: No stenosis/occlusion, aneurysm, or high flow vascular malformation. Standard [place] anatomy.    POSTERIOR CIRCULATION: Fusiform dilatation of the intracranial right vertebral artery relating to the above-described partially thrombosed 13 mm aneurysm. Dominant right and smaller left vertebral artery contribute to a normal basilar artery.       Impression    IMPRESSION:  HEAD MRI:   1.  Corresponding to the abnormality on the recent head CT/CTA, there is evidence of a 13 mm diameter partially thrombosed aneurysm associated with the intracranial right vertebral artery.  2.  The intracranial contents are otherwise unremarkable.    HEAD MRA:   1.  13 mm partially thrombosed presumed dissecting aneurysm associated with the V4 segment of the right vertebral artery.    NOTE: ABNORMAL REPORT    THE DICTATION ABOVE DESCRIBES AN ABNORMALITY FOR WHICH FOLLOW-UP IS NEEDED.

## 2024-02-14 NOTE — PROGRESS NOTES
[facility]    Hospitalist Progress Note    Interval History   Patient awake and alert.  No acute events overnight.  He had 3 different episodes of transient visual field cut and and medial deviation of the left eye while using his computer prior to coming to the hospital.  No further episodes since then.    -Data reviewed today: I reviewed all new labs and imaging results over the last 24 hours. 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    Physical Exam   Temp: 98.7  F (37.1  C) Temp src: Oral BP: 116/87 Pulse: 69   Resp: 16 SpO2: 99 % O2 Device: None (Room air)    Vitals:    02/13/24 1619   Weight: 68 kg (150 lb)     Vital Signs with Ranges  Temp:  [97.8  F (36.6  C)-98.7  F (37.1  C)] 98.7  F (37.1  C)  Pulse:  [65-94] 69  Resp:  [13-22] 16  BP: (116-161)/() 116/87  SpO2:  [96 %-100 %] 99 %  No intake/output data recorded.    Physical Exam  Constitutional:       Appearance: Normal appearance.   Cardiovascular:      Rate and Rhythm: Normal rate and regular rhythm.      Pulses: Normal pulses.      Heart sounds: Normal heart sounds.   Pulmonary:      Effort: Pulmonary effort is normal. No respiratory distress.      Breath sounds: Normal breath sounds.   Abdominal:      General: Abdomen is flat. Bowel sounds are normal. There is no distension.      Tenderness: There is no abdominal tenderness. There is no guarding.   Skin:     General: Skin is warm and dry.   Neurological:      General: No focal deficit present.           Medications    - MEDICATION INSTRUCTIONS -      - MEDICATION INSTRUCTIONS -      sodium chloride 100 mL/hr at 02/14/24 0150      amLODIPine  10 mg Oral Daily    aspirin  81 mg Oral Daily    sodium chloride (PF)  3 mL Intracatheter Q8H       Data   Recent Labs   Lab 02/14/24  0705 02/14/24  0211 02/13/24  1627   WBC 7.0  --  7.5   HGB 13.9  --  14.9   MCV 88  --  87     --   300     --  139   POTASSIUM 4.0  --  5.0   CHLORIDE 107  --  104   CO2 26  --  27   BUN 10.6  --  13.4   CR 0.94  --  0.86   ANIONGAP 8  --  8   JENIFER 9.1  --  9.6   * 101* 106*       Recent Results (from the past 24 hour(s))   CT Head w/o Contrast    Addendum: 2/13/2024    Dr. Carey was contacted by me on 2/13/2024 7:13 PM CST.      Narrative    EXAM: CT HEAD W/O CONTRAST, CTA HEAD NECK W CONTRAST  LOCATION: [facility]  DATE/TIME: 2/13/2024 6:25 PM CST    INDICATION: Intermittent double vision and dizziness  COMPARISON: None.  CONTRAST: 67 mL Isovue 370  TECHNIQUE: Head and neck CT angiogram with IV contrast. Noncontrast head CT followed by axial helical CT images of the head and neck vessels obtained during the arterial phase of intravenous contrast administration. Axial 2D reconstructed images and   multiplanar 3D MIP reconstructed images of the head and neck vessels were performed by the technologist. Dose reduction techniques were used. All stenosis measurements made according to NASCET criteria unless otherwise specified.    FINDINGS:   NONCONTRAST HEAD CT:   INTRACRANIAL CONTENTS: 13 mm hyperdensity in the right premedullary cistern with mild mass effect on the medulla (series 3, image 5). No intracranial hemorrhage, extraaxial collection, or midline shift.  No CT evidence of acute infarct. Normal   parenchymal attenuation. Normal ventricles and sulci.     VISUALIZED ORBITS/SINUSES/MASTOIDS: No intraorbital abnormality. No significant paranasal sinus mucosal disease. No middle ear or mastoid effusion.    BONES/SOFT TISSUES: No acute abnormality.    HEAD CTA:  ANTERIOR CIRCULATION: No stenosis/occlusion, aneurysm, or high flow vascular malformation. Fetal origin of both posterior cerebral arteries from the anterior circulation.    POSTERIOR CIRCULATION: Mild luminal irregularity of the right V4 abutting the hyperdensity in the right premedullary cistern on corresponding  noncontrast CT head. The V4 appears to slightly wraparound the rounded hyperdensity (series 5, image 113). The   rounded hyperdensity is nonopacified. No significant stenosis. No occlusion or high flow vascular malformation.      DURAL VENOUS SINUSES: Expected enhancement of the major dural venous sinuses.    NECK CTA:  RIGHT CAROTID: No measurable stenosis or dissection.    LEFT CAROTID: No measurable stenosis or dissection.    VERTEBRAL ARTERIES: No focal stenosis or dissection. Balanced vertebral arteries.    AORTIC ARCH: Classic aortic arch anatomy with no significant stenosis at the origin of the great vessels.    NONVASCULAR STRUCTURES: Unremarkable.      Impression    IMPRESSION:   HEAD CT:  1.  No acute intracranial process.  2.  13 mm hyperdensity in the right premedullary cistern with mild mass effect on the medulla, described in the CTA section below.    HEAD CTA:   1.  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 Contrast enhanced brain MRI and MRA head recommended to further characterize.  2.  No large vessel occlusion or hemodynamically significant stenosis.    NECK CTA:  1.  No large vessel occlusion or hemodynamically significant stenosis.   CTA Head Neck with Contrast    Addendum: 2/13/2024    Dr. Carey was contacted by me on 2/13/2024 7:13 PM CST.      Narrative    EXAM: CT HEAD W/O CONTRAST, CTA HEAD NECK W CONTRAST  LOCATION: [facility]  DATE/TIME: 2/13/2024 6:25 PM CST    INDICATION: Intermittent double vision and dizziness  COMPARISON: None.  CONTRAST: 67 mL Isovue 370  TECHNIQUE: Head and neck CT angiogram with IV contrast. Noncontrast head CT followed by axial helical CT images of the head and neck vessels obtained during the arterial phase of intravenous contrast administration. Axial 2D reconstructed images and   multiplanar 3D MIP reconstructed images of the head and neck vessels were performed by the technologist. Dose reduction  techniques were used. All stenosis measurements made according to NASCET criteria unless otherwise specified.    FINDINGS:   NONCONTRAST HEAD CT:   INTRACRANIAL CONTENTS: 13 mm hyperdensity in the right premedullary cistern with mild mass effect on the medulla (series 3, image 5). No intracranial hemorrhage, extraaxial collection, or midline shift.  No CT evidence of acute infarct. Normal   parenchymal attenuation. Normal ventricles and sulci.     VISUALIZED ORBITS/SINUSES/MASTOIDS: No intraorbital abnormality. No significant paranasal sinus mucosal disease. No middle ear or mastoid effusion.    BONES/SOFT TISSUES: No acute abnormality.    HEAD CTA:  ANTERIOR CIRCULATION: No stenosis/occlusion, aneurysm, or high flow vascular malformation. Fetal origin of both posterior cerebral arteries from the anterior circulation.    POSTERIOR CIRCULATION: Mild luminal irregularity of the right V4 abutting the hyperdensity in the right premedullary cistern on corresponding noncontrast CT head. The V4 appears to slightly wraparound the rounded hyperdensity (series 5, image 113). The   rounded hyperdensity is nonopacified. No significant stenosis. No occlusion or high flow vascular malformation.      DURAL VENOUS SINUSES: Expected enhancement of the major dural venous sinuses.    NECK CTA:  RIGHT CAROTID: No measurable stenosis or dissection.    LEFT CAROTID: No measurable stenosis or dissection.    VERTEBRAL ARTERIES: No focal stenosis or dissection. Balanced vertebral arteries.    AORTIC ARCH: Classic aortic arch anatomy with no significant stenosis at the origin of the great vessels.    NONVASCULAR STRUCTURES: Unremarkable.      Impression    IMPRESSION:   HEAD CT:  1.  No acute intracranial process.  2.  13 mm hyperdensity in the right premedullary cistern with mild mass effect on the medulla, described in the CTA section below.    HEAD CTA:   1.  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  Contrast enhanced brain MRI and MRA head recommended to further characterize.  2.  No large vessel occlusion or hemodynamically significant stenosis.    NECK CTA:  1.  No large vessel occlusion or hemodynamically significant stenosis.   MR Brain w/o & w Contrast    Narrative    EXAM: MR BRAIN W/O and W CONTRAST, MRA BRAIN ([place]) W/O CONTRAST  LOCATION: [facility]  DATE: 2/13/2024    INDICATION: Transient double vision and dysconjugate gaze. Evaluate for CVA/TIA mass.  COMPARISON: CT/CTA same day  CONTRAST: 7 mL Gadavist  TECHNIQUE:   1) Routine multiplanar multisequence head MRI without and with intravenous contrast.  2) 3D time-of-flight head MRA without intravenous contrast.    FINDINGS:  HEAD MRI:  INTRACRANIAL CONTENTS: No acute or subacute infarct. Corresponding to the lesion demonstrated on the recent head CT/CTA, there is a 13 mm in diameter lesion which appears to be intimately associated with the distal right vertebral artery flow void. It   demonstrates heterogeneous enhancement on postcontrast imaging as well as blooming on the GRE sequence. Appearance is most consistent with a partially thrombosed aneurysm. Normal brain parenchymal signal. Normal ventricles and sulci. Normal position of   the cerebellar tonsils. No additional pathologic enhancement.    SELLA: No abnormality accounting for technique.    OSSEOUS STRUCTURES/SOFT TISSUES: Normal marrow signal. The major intracranial vascular flow voids are maintained.     ORBITS: No abnormality accounting for technique.     SINUSES/MASTOIDS: No paranasal sinus mucosal disease. No middle ear or mastoid effusion.     HEAD MRA:   ANTERIOR CIRCULATION: No stenosis/occlusion, aneurysm, or high flow vascular malformation. Standard [place] anatomy.    POSTERIOR CIRCULATION: Fusiform dilatation of the intracranial right vertebral artery relating to the above-described partially thrombosed 13 mm aneurysm. Dominant right  and smaller left vertebral artery contribute to a normal basilar artery.       Impression    IMPRESSION:  HEAD MRI:   1.  Corresponding to the abnormality on the recent head CT/CTA, there is evidence of a 13 mm diameter partially thrombosed aneurysm associated with the intracranial right vertebral artery.  2.  The intracranial contents are otherwise unremarkable.    HEAD MRA:   1.  13 mm partially thrombosed presumed dissecting aneurysm associated with the V4 segment of the right vertebral artery.    NOTE: ABNORMAL REPORT    THE DICTATION ABOVE DESCRIBES AN ABNORMALITY FOR WHICH FOLLOW-UP IS NEEDED.    MRA Angiogram Head w/o Contrast    Narrative    EXAM: MR BRAIN W/O and W CONTRAST, MRA BRAIN ([place]) W/O CONTRAST  LOCATION: [facility]  DATE: 2/13/2024    INDICATION: Transient double vision and dysconjugate gaze. Evaluate for CVA/TIA mass.  COMPARISON: CT/CTA same day  CONTRAST: 7 mL Gadavist  TECHNIQUE:   1) Routine multiplanar multisequence head MRI without and with intravenous contrast.  2) 3D time-of-flight head MRA without intravenous contrast.    FINDINGS:  HEAD MRI:  INTRACRANIAL CONTENTS: No acute or subacute infarct. Corresponding to the lesion demonstrated on the recent head CT/CTA, there is a 13 mm in diameter lesion which appears to be intimately associated with the distal right vertebral artery flow void. It   demonstrates heterogeneous enhancement on postcontrast imaging as well as blooming on the GRE sequence. Appearance is most consistent with a partially thrombosed aneurysm. Normal brain parenchymal signal. Normal ventricles and sulci. Normal position of   the cerebellar tonsils. No additional pathologic enhancement.    SELLA: No abnormality accounting for technique.    OSSEOUS STRUCTURES/SOFT TISSUES: Normal marrow signal. The major intracranial vascular flow voids are maintained.     ORBITS: No abnormality accounting for technique.     SINUSES/MASTOIDS: No  paranasal sinus mucosal disease. No middle ear or mastoid effusion.     HEAD MRA:   ANTERIOR CIRCULATION: No stenosis/occlusion, aneurysm, or high flow vascular malformation. Standard [place] anatomy.    POSTERIOR CIRCULATION: Fusiform dilatation of the intracranial right vertebral artery relating to the above-described partially thrombosed 13 mm aneurysm. Dominant right and smaller left vertebral artery contribute to a normal basilar artery.       Impression    IMPRESSION:  HEAD MRI:   1.  Corresponding to the abnormality on the recent head CT/CTA, there is evidence of a 13 mm diameter partially thrombosed aneurysm associated with the intracranial right vertebral artery.  2.  The intracranial contents are otherwise unremarkable.    HEAD MRA:   1.  13 mm partially thrombosed presumed dissecting aneurysm associated with the V4 segment of the right vertebral artery.    NOTE: ABNORMAL REPORT    THE DICTATION ABOVE DESCRIBES AN ABNORMALITY FOR WHICH FOLLOW-UP IS NEEDED.          Assessment & Plan      Silas Lima is a 48 year old male admitted on 2/13/2024. He presents to the emergency department for evaluation of stuttering double vision, wife noted some medial deviation of the left eye during his episodes.  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  Possible recurrent thrombotic events with partially thrombosed aneurysm  -Aspirin 81 mg daily, no heparin per neurology.  -Stroke neurology as well as neurointerventional consulted, aware of patient from emergency department  -Plan on proceeding with cerebral angiogram this afternoon.  -Goal systolic blood pressure less than 140.  As needed labetalol and hydralazine available to maintain  this  -Increasing prior to admission amlodipine from 5 mg daily to 10 mg daily  -Vital signs and neurochecks every 4 hours; if recurrence of symptoms, may need to advance to neuro IMC status.  Discussed with patient.  -Hemoglobin A1c 5.8.  Lipid panel pending.     Hypertension:  -Increasing prior to admission amlodipine from 5 mg daily to 10 mg daily.  -Low threshold for addition of carvedilol for improved blood pressure control if needed     Hyperlipidemia: History of significantly elevated LDL historically, last in 2018.  Not on statin therapy  -Lipid panel ordered; pending [place] risk assessment recalculation with updated lipid panel, anticipate initiation of statin therapy.  Discussed with patient on admission.  Based on 2018 lipid panel with systolic blood pressure of 150, would be 11% 10-year risk.        DVT Prophylaxis: Pneumatic Compression Devices  Code Status: Full Code  Disposition: Expected discharge when medically stable      Emerita Bunch MD, MD  [phone](p)

## 2024-02-14 NOTE — PROGRESS NOTES
RECEIVING UNIT ED HANDOFF REVIEW    ED Nurse Handoff Report was reviewed by: Krissy Canada RN on February 14, 2024 at 12:34 AM

## 2024-02-14 NOTE — CONSULTS
[facility]    Stroke Consult Note    Reason for Consult: Thrombosed R vertebral artery aneurysm (V4)    Chief Complaint: Eye Problem and Vision Loss       KORI Lima is a 48 year old man with h/o HTN who was working from home on his computer around 12 noon when he suddenly had a light headed sensation and double vision x 5 minutes. Wife noted that his gaze was lagging or dysconjugate during this time. He again had another episode in the afternoon that was similar to prior but was transient. Exam at ED was non focal.     Today on interview, patient and wife reports the same as above but apparently he had 3 events yesterday, one of which was associated with dysconjugate gaze as noted by his wife. He was also unsteady on his feet the third time around. No other neuro deficits were noted. No headaches. No personal or family h/o aneurysms or kidney disease.     Evaluation Summarized    MRI/Head CT - CT: 13 mm hyperdensity in the right premedullary cistern with mild mass effect on the medulla   - MRI: Corresponding to the abnormality on the recent head CT/CTA, there is evidence of a 13 mm diameter partially thrombosed aneurysm associated with the intracranial right vertebral artery.    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     MRA - 13 mm partially thrombosed presumed dissecting aneurysm associated with the V4 segment of the right vertebral artery.     DSA 2/14: Right V4 segment dissecting thrombosed aneurysm    Cervical Vasculature No large vessel occlusion or hemodynamically significant stenosis      Echocardiogram 65% EF, No regional wall motion  abnormalities, normal atrial sizes,  normal left ventricular wall thickness   EKG/Telemetry SR   Other Testing Not Applicable      LDL  2/14/2024: 125 mg/dL   A1C  2/13/2024: 5.8 %   Troponin 2/13/2024: <6 ng/L       Impression  - Right vertebral artery (V4/intradural)  "dissection adjacent to large thrombosed aneurysm     Recommendations   - Per Neuro IR; start on DAPT with Aspirin 81 mg and Plavix 75 mg  - SBP < 140  - Neuro IR considering Would treating the aneurysm with a flow diverter; unclear timing. Will need further discussion with Neuro IR to finalize a plan.       Patient Follow-up    - final recommendation pending work-up    Thank you for this consult. We will continue to follow.     The Stroke Staff is Dr. Randolph.    Geo Harkins MD  Vascular Neurology Fellow    To page me or covering stroke neurology team member, click here: [facility]  Choose \"On Call\" tab at top, then select \"NEUROLOGY/ALL SITES\" from middle drop-down box, press Enter, then look for \"stroke\" or \"telestroke\" for your site.  _____________________________________________________    Clinically Significant Risk Factors Present on Admission                                     Past Medical History    History reviewed. No pertinent past medical history.  Medications   Home Meds  Prior to Admission medications    Medication Sig Start Date End Date Taking? Authorizing Provider   amLODIPine (NORVASC) 5 MG tablet Take 5 mg by mouth daily 6/11/22  Yes Reported, Patient       Scheduled Meds   amLODIPine  10 mg Oral Daily    aspirin  81 mg Oral Daily    sodium chloride (PF)  3 mL Intracatheter Q8H    sodium chloride (PF)  3 mL Intracatheter Q8H       Infusion Meds   - MEDICATION INSTRUCTIONS -      heparin      heparin      - MEDICATION INSTRUCTIONS -      - MEDICATION INSTRUCTIONS -      sodium chloride      sodium chloride 100 mL/hr at 02/14/24 0150       Allergies   No Known Allergies       PHYSICAL EXAMINATION   Temp:  [97.8  F (36.6  C)-98.7  F (37.1  C)] 98.7  F (37.1  C)  Pulse:  [65-94] 74  Resp:  [10-22] 14  BP: (116-161)/() 132/94  SpO2:  [92 %-100 %] 93 %    Neurologic  Mental Status:  alert, oriented x 3, follows commands, speech clear and fluent  Cranial Nerves:  visual fields intact, EOMI with " "normal smooth pursuit, facial movements symmetric, hearing not formally tested but intact to conversation, no dysarthria  Motor:  normal muscle tone and bulk, no abnormal movements, able to move all limbs spontaneously, no pronator drift  Coordination:  normal finger-to-nose and heel-to-shin bilaterally without dysmetria, rapid alternating movements symmetric  Station/Gait:  deferred      Imaging  I personally reviewed all imaging; relevant findings per HPI.    Labs Data   CBC  Recent Labs   Lab 02/14/24  0705 02/13/24  1627   WBC 7.0 7.5   RBC 4.69 4.97   HGB 13.9 14.9   HCT 41.1 43.1    300     Basic Metabolic Panel   Recent Labs   Lab 02/14/24  0705 02/14/24  0211 02/13/24  1627     --  139   POTASSIUM 4.0  --  5.0   CHLORIDE 107  --  104   CO2 26  --  27   BUN 10.6  --  13.4   CR 0.94  --  0.86   * 101* 106*   JENIFER 9.1  --  9.6     Liver Panel  No results for input(s): \"PROTTOTAL\", \"ALBUMIN\", \"BILITOTAL\", \"ALKPHOS\", \"AST\", \"ALT\", \"BILIDIRECT\" in the last 168 hours.  INR  No lab results found.        Stroke Consult Data Data   This was a non-emergent, non-telestroke consult.    "

## 2024-02-15 ENCOUNTER — TELEPHONE (OUTPATIENT)
Dept: OTHER | Facility: CLINIC | Age: 49
End: 2024-02-15
Payer: COMMERCIAL

## 2024-02-15 ENCOUNTER — APPOINTMENT (OUTPATIENT)
Dept: OCCUPATIONAL THERAPY | Facility: CLINIC | Age: 49
End: 2024-02-15
Attending: INTERNAL MEDICINE
Payer: COMMERCIAL

## 2024-02-15 VITALS
WEIGHT: 150 LBS | TEMPERATURE: 98.9 F | OXYGEN SATURATION: 97 % | HEART RATE: 82 BPM | HEIGHT: 67 IN | DIASTOLIC BLOOD PRESSURE: 96 MMHG | SYSTOLIC BLOOD PRESSURE: 136 MMHG | BODY MASS INDEX: 23.54 KG/M2 | RESPIRATION RATE: 16 BRPM

## 2024-02-15 PROCEDURE — 99239 HOSP IP/OBS DSCHRG MGMT >30: CPT | Performed by: HOSPITALIST

## 2024-02-15 PROCEDURE — 97112 NEUROMUSCULAR REEDUCATION: CPT | Mod: GO

## 2024-02-15 PROCEDURE — 250N000013 HC RX MED GY IP 250 OP 250 PS 637: Performed by: INTERNAL MEDICINE

## 2024-02-15 PROCEDURE — 999N000226 HC STATISTIC SLP IP EVAL DEFER: Performed by: SPEECH-LANGUAGE PATHOLOGIST

## 2024-02-15 PROCEDURE — 97165 OT EVAL LOW COMPLEX 30 MIN: CPT | Mod: GO

## 2024-02-15 PROCEDURE — 250N000013 HC RX MED GY IP 250 OP 250 PS 637: Performed by: STUDENT IN AN ORGANIZED HEALTH CARE EDUCATION/TRAINING PROGRAM

## 2024-02-15 PROCEDURE — 99232 SBSQ HOSP IP/OBS MODERATE 35: CPT | Mod: GC | Performed by: PSYCHIATRY & NEUROLOGY

## 2024-02-15 RX ORDER — ATORVASTATIN CALCIUM 80 MG/1
80 TABLET, FILM COATED ORAL EVERY EVENING
Status: DISCONTINUED | OUTPATIENT
Start: 2024-02-15 | End: 2024-02-15 | Stop reason: HOSPADM

## 2024-02-15 RX ORDER — ATORVASTATIN CALCIUM 80 MG/1
80 TABLET, FILM COATED ORAL EVERY EVENING
Qty: 90 TABLET | Refills: 3 | Status: SHIPPED | OUTPATIENT
Start: 2024-02-15 | End: 2024-04-05

## 2024-02-15 RX ORDER — CLOPIDOGREL BISULFATE 75 MG/1
75 TABLET ORAL DAILY
Qty: 30 TABLET | Refills: 1 | Status: SHIPPED | OUTPATIENT
Start: 2024-02-16 | End: 2024-04-05

## 2024-02-15 RX ORDER — AMLODIPINE BESYLATE 10 MG/1
10 TABLET ORAL DAILY
Qty: 30 TABLET | Refills: 3 | Status: SHIPPED | OUTPATIENT
Start: 2024-02-16 | End: 2024-05-14

## 2024-02-15 RX ORDER — ASPIRIN 81 MG/1
81 TABLET ORAL DAILY
Qty: 90 TABLET | Refills: 3 | Status: SHIPPED | OUTPATIENT
Start: 2024-02-16

## 2024-02-15 RX ADMIN — AMLODIPINE BESYLATE 10 MG: 10 TABLET ORAL at 09:27

## 2024-02-15 RX ADMIN — ASPIRIN 81 MG: 81 TABLET, COATED ORAL at 09:27

## 2024-02-15 RX ADMIN — CLOPIDOGREL BISULFATE 75 MG: 75 TABLET ORAL at 09:27

## 2024-02-15 ASSESSMENT — ACTIVITIES OF DAILY LIVING (ADL)
ADLS_ACUITY_SCORE: 18

## 2024-02-15 NOTE — TELEPHONE ENCOUNTER
Referral received via [place] on 2/15/24.    Pt referred to [facility] by Geo Harkins MD for young patient with right vertebral artery dissection (intracranial), TIA, thromboembolism.     Routing to scheduling to coordinate the following:  NEW VASCULAR PATIENT consult with Vascular Medicine  Please schedule this at next available    Appt note:  New pt ref by Geo Harkins MD for young patient with right vertebral artery dissection (intracranial), Hx of TIA, thromboembolism.     DEWAYNE Ott, RN  [facility]  Office:  [phone] Fax: [phone]

## 2024-02-15 NOTE — DISCHARGE INSTRUCTIONS
Your risk factors for stroke or TIA (transient ischemic attack):     Your Risk Factors Your Results Goals   [x] High blood pressure BP: (!) 136/96 (02/15/24 1117) Less than 120/80   [x] Cholesterol          Total 2/14/2024: 202 mg/dL; 208 mg/dL   Less than 150    Triglycerides   2/14/2024: 201 mg/dL; 193 mg/dL Less than 150    LDL 2/14/2024: 125 mg/dL; 132 mg/dL    Less than 70    HDL 2/14/2024: 37 mg/dL; 37 mg/dL         Greater than 40 (men)  Greater than 50 (women)   [] Diabetes                A1C 2/13/2024: 5.8 % Less than 5.7   [] Atrial fibrillation Atrial fibrillation noted on cardiac monitoring Manage per physician orders   [] Smoking/tobacco use   Tobacco Use      Smoking status: Never      Smokeless tobacco: None   Quit smoking and tobacco   [] Overweight Body mass index is 23.49 kg/m .  Less than 25     Other risk factors include: carotid (neck) artery disease, other heart diseases, prior stroke or TIA, poor diet, lack of exercise, and excessive alcohol consumption.     [x] Written stroke educational materials given to patient and significant other including:   {written material:431788}       Know the warning signs and symptoms of stroke: BE FAST     B = Balance loss   E = Eyesight changes   F = Facial droop or numbness   A = Arm or leg weakness   S = Speech difficulty, slurred speech   T = Time to call 911 for help

## 2024-02-15 NOTE — PROGRESS NOTES
[facility]    Stroke Progress Note    Interval Events  NAEO; had a mild headache    BP trend:     HPI Summary  Silas Lima is a 48 year old man with h/o HTN who was working from home on his computer around 12 noon when he suddenly had a light headed sensation and double vision x 5 minutes. Wife noted that his gaze was lagging or dysconjugate during this time. He again had another episode in the afternoon that was similar to prior but was transient. Exam at ED was non focal.      Today on interview, patient and wife reports the same as above but apparently he had 3 events yesterday, one of which was associated with dysconjugate gaze as noted by his wife. He was also unsteady on his feet the third time around. No other neuro deficits were noted. No headaches. No personal or family h/o aneurysms or kidney disease.     Patient works for [employer] as an . He has been suffering from neck pain since 2021 June after he travelled from [place]. In 2021 his neck pain was severe and sought treatment including chiropractic neck manipulation and massage. Patient recalls being vertiginous a day after the chiropractic manipulation.     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      MRA - 13 mm partially thrombosed presumed dissecting aneurysm associated with the V4 segment of the right vertebral artery.      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 Goal LDL < 70  - Neuro IR considering treating the aneurysm with a flow diverter; unclear timing. Will need further discussion with Neuro IR to finalize a plan.  - PT/OT consult   - Euthermia, euglycemia  - Avoid straining or weight lifting (limit to 20 pounds)     Patient Follow-up    - Stroke neurology clinic follow up in 6 weeks - ordered  - Vascular Medicine Referral  - Neuro IR will follow up in 2 weeks  - Will need ophthalmology referral for mild straining that patient complained of; to assess for misalignment of eyes.     Thank you for this consult. No further inpatient work up is needed.     The Stroke Staff is Dr. Randolph.     Geo Harkins MD  Vascular Neurology Fellow     To page me or covering stroke neurology team member, click here: [facility]  Choose \"On Call\" tab at top, then select \"NEUROLOGY/ALL SITES\" from middle drop-down box, press Enter, then look for \"stroke\" or \"telestroke\" for your site.  ______________________________________________________    Clinically Significant Risk Factors                                      Medications   Scheduled Meds   amLODIPine  10 mg Oral Daily    aspirin  81 mg Oral Daily    clopidogrel  75 mg Oral Daily    sodium chloride (PF)  3 mL Intracatheter Q8H    sodium chloride (PF)  3 mL Intracatheter Q8H       Infusion Meds   - MEDICATION INSTRUCTIONS -      - MEDICATION INSTRUCTIONS -      - MEDICATION INSTRUCTIONS -      sodium chloride      sodium " "chloride Stopped (02/14/24 1551)       PRN Meds  acetaminophen **OR** acetaminophen, - MEDICATION INSTRUCTIONS -, labetalol **OR** hydrALAZINE, HYDROmorphone, lidocaine 4%, lidocaine 4%, lidocaine (buffered or not buffered), lidocaine (buffered or not buffered), - MEDICATION INSTRUCTIONS -, - MEDICATION INSTRUCTIONS -, naloxone **OR** naloxone **OR** naloxone **OR** naloxone, ondansetron **OR** ondansetron, oxyCODONE, oxyCODONE, prochlorperazine **OR** prochlorperazine **OR** prochlorperazine, sodium chloride (PF), sodium chloride (PF), sodium chloride       PHYSICAL EXAMINATION  Temp:  [98  F (36.7  C)-98.6  F (37  C)] 98.6  F (37  C)  Pulse:  [70-89] (P) 70  Resp:  [10-18] 12  BP: (118-140)/() 124/80  SpO2:  [92 %-99 %] 96 %      Neuro: Awake, alert, no aphasia, no dysarthria, EOMI, no facial asymmetry, moving all 4 extremities symmetrically - grade 5 strength in b/l  strength and hip flexors and dorsiflexion.    Imaging  I personally reviewed all imaging; relevant findings per HPI.     Lab Results Data   CBC  Recent Labs   Lab 02/14/24  1557 02/14/24  0705 02/13/24  1627   WBC 7.9 7.0 7.5   RBC 4.71 4.69 4.97   HGB 14.2 13.9 14.9   HCT 40.9 41.1 43.1    275 300     Basic Metabolic Panel    Recent Labs   Lab 02/14/24  1557 02/14/24  0705 02/14/24  0211 02/13/24  1627    141  --  139   POTASSIUM 3.9 4.0  --  5.0   CHLORIDE 107 107  --  104   CO2 24 26  --  27   BUN 8.3 10.6  --  13.4   CR 0.78 0.94  --  0.86   * 103* 101* 106*   JENIFER 9.0 9.1  --  9.6     Liver Panel  No results for input(s): \"PROTTOTAL\", \"ALBUMIN\", \"BILITOTAL\", \"ALKPHOS\", \"AST\", \"ALT\", \"BILIDIRECT\" in the last 168 hours.  INR    Recent Labs   Lab Test 02/14/24  1557   INR 1.14      Lipid Profile    Recent Labs   Lab Test 02/14/24  0705   CHOL 208*  202*   HDL 37*  37*   *  125*   TRIG 193*  201*     A1C    Recent Labs   Lab Test 02/13/24  1627   A1C 5.8*     Troponin    Recent Labs   Lab 02/13/24  1824 "   CTROPT <6          Data

## 2024-02-15 NOTE — PROGRESS NOTES
"   02/15/24 0900   Appointment Info   Signing Clinician's Name / Credentials (OT) Sindhu Fernando, OTR/L   Living Environment   People in Home spouse   Current Living Arrangements house   Living Environment Comments Pt lives w/ spouse in multi level home; has stairs and railing which switches sides at platform.   Self-Care   Usual Activity Tolerance excellent   Current Activity Tolerance good   Equipment Currently Used at Home none   Fall history within last six months no   Activity/Exercise/Self-Care Comment Pt was IND at baseline w/ all ADLs.   Instrumental Activities of Daily Living (IADL)   IADL Comments IND w/ IADLs at baseline, works a lot on computers and on his phone. Pt reports he is needing new near sight glasses.   General Information   Onset of Illness/Injury or Date of Surgery 02/13/24   Referring Physician Vergara, Wilfredo Jefferson MD   Patient/Family Therapy Goal Statement (OT) return to home and work   Additional Occupational Profile Info/Pertinent History of Current Problem Per chart review: \"Silas Lima is a 48 year old man with h/o HTN who was working from home on his computer around 12 noon when he suddenly had a light headed sensation and double vision x 5 minutes. Wife noted that his gaze was lagging or dysconjugate during this time. He again had another episode in the afternoon that was similar to prior but was transient.\"   General Observations and Info angio yesterday, was on bedrest util 630am   Cognitive Status Examination   Orientation Status orientation to person, place and time   Cognitive Status Comments no cognitive concerns   Visual Perception   Visual Motor Impairment visual tracking, down;convergence, left;convergence, right   Impact of Vision Impairment on Function (Vision) difficulty w/ near sight, performed convergence testing better w/ glasses off, double vision when object is ~12'' away, see note below for exercises provided   Sensory   Sensory Comments no concerns   Pain Assessment "   Patient Currently in Pain No   Range of Motion Comprehensive   Comment, General Range of Motion UE WFL   Strength Comprehensive (MMT)   Comment, General Manual Muscle Testing (MMT) Assessment WFL   Coordination   Coordination Comments no deficits identified   Bed Mobility   Comment (Bed Mobility) ind   Transfers   Transfer Comments SBA-IND   Balance   Balance Comments no concerns, ambulate no AD in hallway see TA note below   Activities of Daily Living   BADL Assessment/Intervention lower body dressing;toileting   Lower Body Dressing Assessment/Training   Comment, (Lower Body Dressing) IND   Toileting   Comment, (Toileting) SBA in room d/t lines but has been IND w/ actual transfer, no concerns   Clinical Impression   Criteria for Skilled Therapeutic Interventions Met (OT) Yes, treatment indicated   OT Diagnosis impaired vision   OT Problem List-Impairments impacting ADL problems related to;activity tolerance impaired;vision   Assessment of Occupational Performance 1-3 Performance Deficits   Identified Performance Deficits vision/convergence   Planned Therapy Interventions (OT) visual perception;home program guidelines   Clinical Decision Making Complexity (OT) problem focused assessment/low complexity   Risk & Benefits of therapy have been explained evaluation/treatment results reviewed;care plan/treatment goals reviewed;risks/benefits reviewed;current/potential barriers reviewed;participants voiced agreement with care plan;participants included;patient   OT Total Evaluation Time   OT Eval, Low Complexity Minutes (01938) 9   OT Goals   Therapy Frequency (OT) One time eval and treatment   OT Predicted Duration/Target Date for Goal Attainment 02/15/24   OT Goals OT Goal 1;Aerobic Activity   OT: Perform aerobic activity with stable cardiovascular response 5 minutes;continuous activity   OT: Goal 1 Pt will verbalize and demonstrate understanding of vision HEP.   Interventions   Interventions Quick Adds Neuromuscular  "Re-education   Neuromuscular Re-Education   Neuromuscular Re-Education Minutes (80565) 24   Symptoms Noted During/After Treatment fatigue   Treatment Detail/Skilled Intervention OT: After performing vision screen pt provided oculomotor exercises for convergence and divergence d/t observed deficits and increased double vision at 12\" distance (vs normal of 1-2 inches from nose); performed better w/o glasses but still w/ deficits. Edu on purpose and goal of these exercises. Recommended 2-3x/day and 2-5 reps of each. Handout issued. Edu on optional outpatient for continued vision. Pt and spouse asking about restrictions upon returning to home, encouraged to ask MD about lifting restriction and return to driving. Recommended pt take car in open lot/residential street when it is safe to return to make sure he feels at basline. Ambulates 300' SBA (OT managing IMC monitor), x2 reps of 4 stairs w/ alternating railing. Edu to pt/spouse and RN that if he has a portable heart monitor encourage walking in hallway for continued activity.   OT Discharge Planning   OT Plan sign off - optional outpatient   OT Discharge Recommendation (DC Rec) home with outpatient occupational therapy   OT Rationale for DC Rec Pt functioning near baseline but still minor  oculomotor deficits; if pt returns to work and feels like his vision is not at baseline he can consult w/ OP OT. Expect near sight glasses and returning to daily activities will continue to resolve pts current deficits.   Total Session Time   Timed Code Treatment Minutes 24   Total Session Time (sum of timed and untimed services) 33       "

## 2024-02-15 NOTE — PLAN OF CARE
SLP Deferral note: Order received for swallow evaluation. Chart reviewed and consulted with RN and met with patient, spouse and son. Patient plans to discharge home today. Patient has regular diet ordered and tolerating with no swallowing concerns per RN and patient/family report. Patient observed to drink sips of water by straw with no overt aspiration signs. Provided education about signs and symptoms of aspiration to watch for and instructed patient to notify medical team if occur for OP SLP swallow evaluation.

## 2024-02-15 NOTE — DISCHARGE SUMMARY
[facility]    Discharge Summary  Hospitalist    Date of Admission:  2/13/2024  Date of Discharge:  2/15/2024    Discharge Diagnoses      Diplopia  Cerebral aneurysm  TIA (transient ischemic attack)    History of Present Illness   Silas Lima is an 48 year old male who presented with stuttering left sided vision loss and nystagmus     Hospital Course   Silas Lima was admitted on 2/13/2024.  The following problems were addressed during his hospitalization:    Silas Lima is a 48 year old male admitted on 2/13/2024. He presents to the emergency department for evaluation of stuttering double vision, wife noted some medial deviation of the left eye during his episodes.  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  Possible recurrent thrombotic events with partially thrombosed aneurysm  -Aspirin 81 mg daily, no heparin per neurology.  -Stroke neurology as well as neurointerventional consulted, aware of patient from emergency department  -Plan on proceeding with cerebral angiogram this afternoon.  -Goal systolic blood pressure less than 140.  As needed labetalol and hydralazine available to maintain this  -Increasing prior to admission amlodipine from 5 mg daily to 10 mg daily  -Vital signs and neurochecks every 4 hours; if recurrence of symptoms, may need to advance to neuro IMC status.  Discussed with patient.  -Hemoglobin A1c 5.8.  Lipid panel showed hyperlipidemia ,  -[place] cerebral angiography was done.  There is evidence of intradural right work temporal artery dissection adjacent to large thrombosed aneurysm.  Plan is to treat the aneurysm with a flow diverter 2 weeks down the line.  -Started on aspirin 81 daily,  Plavix 75 daily, atorvastatin 80 mg daily.  -Cleared for discharge home with follow-up with neurology in 6 weeks, neurointerventional with 2 weeks.  Also referral to ophthalmology for assessment of misalignment of eyes.     Hypertension:  -Increasing prior to admission amlodipine from 5 mg daily to 10 mg daily.  -Low threshold for addition of carvedilol for improved blood pressure control if needed     Hyperlipidemia: History of significantly elevated LDL historically, last in 2018.  Not on statin therapy  -Lipid panel ordered; pending [place] risk assessment recalculation with updated lipid panel, anticipate initiation of statin therapy.  Discussed with patient on admission.  Based on 2018 lipid panel with systolic blood pressure of 150, would be 11% 10-year risk.        DVT Prophylaxis: Pneumatic Compression Devices  Code Status: Full Code  Disposition: Expected discharge when medically stable      Emerita Bunch MD, MD  [phone](p)             Emerita Bunch MD, MD        Code Status   Full Code       Primary Care Physician   Physician No Ref-Primary    Physical Exam   Temp: 98.9  F (37.2  C) Temp src: Oral BP: (!) 136/96 Pulse: 82   Resp: 16 SpO2: 97 % O2 Device: None (Room air)    Vitals:    02/13/24 1619   Weight: 68 kg (150 lb)     Vital Signs with Ranges  Temp:  [98  F (36.7  C)-98.9  F (37.2  C)] 98.9  F (37.2  C)  Pulse:  [70-86] 82  Resp:  [12-18] 16  BP: (119-140)/() 136/96  SpO2:  [96 %-99 %] 97 %  I/O last 3 completed shifts:  In: 120 [P.O.:120]  Out: -     Physical Exam  Constitutional:       Appearance: Normal appearance.   Cardiovascular:      Rate and Rhythm: Normal rate and regular rhythm.      Pulses: Normal pulses.      Heart sounds: Normal heart sounds.   Pulmonary:      Effort: Pulmonary effort is normal. No respiratory distress.      Breath sounds: Normal breath sounds.   Abdominal:      General: Abdomen is flat. Bowel sounds are normal. There is no distension.      Tenderness:  There is no abdominal tenderness. There is no guarding.   Skin:     General: Skin is warm and dry.   Neurological:      General: No focal deficit present.           Discharge Disposition   Discharged to home  Condition at discharge: Stable    Consultations This Hospital Stay   SPEECH LANGUAGE PATH ADULT IP CONSULT  PHARMACY IP CONSULT  PHARMACY IP CONSULT  PHARMACY IP CONSULT  PHYSICAL THERAPY ADULT IP CONSULT  OCCUPATIONAL THERAPY ADULT IP CONSULT  REHAB ADMISSIONS LIAISON IP CONSULT  CARE MANAGEMENT / SOCIAL WORK IP CONSULT  NEUROLOGY INTERVENTIONAL ADULT IP CONSULT  NEUROLOGY IP STROKE CONSULT  PHYSICAL THERAPY ADULT IP CONSULT    Time Spent on this Encounter   IEmerita MD, personally saw the patient today and spent greater than 30 minutes discharging this patient.    Discharge Orders      Vascular Medicine Referral      Reason for your hospital stay    Vertebral artery dissection     Activity    Your activity upon discharge: activity as tolerated     Follow-up and recommended labs and tests     Follow up with primary care provider, Physician No Ref-Primary, within 7 days for hospital follow- up.  The following labs/tests are recommended: cbc, bmp in 1 week .  Follow up with neurointeventional team in 2 weeks     Diet    Follow this diet upon discharge: Orders Placed This Encounter      Regular Diet Adult     Stroke Hospital Follow Up (for neurologist use only)    [facility] will call you to coordinate care as prescribed by your provider. If you don t hear from a representative within 2 business days, please call [phone].       Discharge Medications   Current Discharge Medication List        START taking these medications    Details   aspirin 81 MG EC tablet Take 1 tablet (81 mg) by mouth daily  Qty: 90 tablet, Refills: 3    Associated Diagnoses: Diplopia      atorvastatin (LIPITOR) 80 MG tablet Take 1 tablet (80 mg) by mouth every evening  Qty: 90 tablet, Refills: 3    Associated  Diagnoses: Diplopia      clopidogrel (PLAVIX) 75 MG tablet Take 1 tablet (75 mg) by mouth daily  Qty: 30 tablet, Refills: 1    Associated Diagnoses: Diplopia           CONTINUE these medications which have CHANGED    Details   amLODIPine (NORVASC) 10 MG tablet Take 1 tablet (10 mg) by mouth daily  Qty: 30 tablet, Refills: 3    Associated Diagnoses: Diplopia           Allergies   No Known Allergies  Data   Recent Labs   Lab Test 02/14/24  1557 02/14/24  0705 02/13/24  1627   WBC 7.9 7.0 7.5   HGB 14.2 13.9 14.9   MCV 87 88 87    275 300   INR 1.14  --   --       Recent Labs   Lab Test 02/14/24  1557 02/14/24  0705 02/14/24  0211 02/13/24  1627    141  --  139   POTASSIUM 3.9 4.0  --  5.0   CHLORIDE 107 107  --  104   CO2 24 26  --  27   BUN 8.3 10.6  --  13.4   CR 0.78 0.94  --  0.86   ANIONGAP 10 8  --  8   JENIFER 9.0 9.1  --  9.6   * 103* 101* 106*         Results for orders placed or performed during the hospital encounter of 02/13/24   CT Head w/o Contrast    Addendum: 2/13/2024    Dr. Carey was contacted by me on 2/13/2024 7:13 PM CST.      Narrative    EXAM: CT HEAD W/O CONTRAST, CTA HEAD NECK W CONTRAST  LOCATION: [facility]  DATE/TIME: 2/13/2024 6:25 PM CST    INDICATION: Intermittent double vision and dizziness  COMPARISON: None.  CONTRAST: 67 mL Isovue 370  TECHNIQUE: Head and neck CT angiogram with IV contrast. Noncontrast head CT followed by axial helical CT images of the head and neck vessels obtained during the arterial phase of intravenous contrast administration. Axial 2D reconstructed images and   multiplanar 3D MIP reconstructed images of the head and neck vessels were performed by the technologist. Dose reduction techniques were used. All stenosis measurements made according to NASCET criteria unless otherwise specified.    FINDINGS:   NONCONTRAST HEAD CT:   INTRACRANIAL CONTENTS: 13 mm hyperdensity in the right premedullary cistern with mild mass effect on  the medulla (series 3, image 5). No intracranial hemorrhage, extraaxial collection, or midline shift.  No CT evidence of acute infarct. Normal   parenchymal attenuation. Normal ventricles and sulci.     VISUALIZED ORBITS/SINUSES/MASTOIDS: No intraorbital abnormality. No significant paranasal sinus mucosal disease. No middle ear or mastoid effusion.    BONES/SOFT TISSUES: No acute abnormality.    HEAD CTA:  ANTERIOR CIRCULATION: No stenosis/occlusion, aneurysm, or high flow vascular malformation. Fetal origin of both posterior cerebral arteries from the anterior circulation.    POSTERIOR CIRCULATION: Mild luminal irregularity of the right V4 abutting the hyperdensity in the right premedullary cistern on corresponding noncontrast CT head. The V4 appears to slightly wraparound the rounded hyperdensity (series 5, image 113). The   rounded hyperdensity is nonopacified. No significant stenosis. No occlusion or high flow vascular malformation.      DURAL VENOUS SINUSES: Expected enhancement of the major dural venous sinuses.    NECK CTA:  RIGHT CAROTID: No measurable stenosis or dissection.    LEFT CAROTID: No measurable stenosis or dissection.    VERTEBRAL ARTERIES: No focal stenosis or dissection. Balanced vertebral arteries.    AORTIC ARCH: Classic aortic arch anatomy with no significant stenosis at the origin of the great vessels.    NONVASCULAR STRUCTURES: Unremarkable.      Impression    IMPRESSION:   HEAD CT:  1.  No acute intracranial process.  2.  13 mm hyperdensity in the right premedullary cistern with mild mass effect on the medulla, described in the CTA section below.    HEAD CTA:   1.  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 Contrast enhanced brain MRI and MRA head recommended to further characterize.  2.  No large vessel occlusion or hemodynamically significant stenosis.    NECK CTA:  1.  No large vessel occlusion or hemodynamically significant stenosis.   CTA Head Neck with  Contrast    Addendum: 2/13/2024    Dr. Carey was contacted by me on 2/13/2024 7:13 PM CST.      Narrative    EXAM: CT HEAD W/O CONTRAST, CTA HEAD NECK W CONTRAST  LOCATION: [facility]  DATE/TIME: 2/13/2024 6:25 PM CST    INDICATION: Intermittent double vision and dizziness  COMPARISON: None.  CONTRAST: 67 mL Isovue 370  TECHNIQUE: Head and neck CT angiogram with IV contrast. Noncontrast head CT followed by axial helical CT images of the head and neck vessels obtained during the arterial phase of intravenous contrast administration. Axial 2D reconstructed images and   multiplanar 3D MIP reconstructed images of the head and neck vessels were performed by the technologist. Dose reduction techniques were used. All stenosis measurements made according to NASCET criteria unless otherwise specified.    FINDINGS:   NONCONTRAST HEAD CT:   INTRACRANIAL CONTENTS: 13 mm hyperdensity in the right premedullary cistern with mild mass effect on the medulla (series 3, image 5). No intracranial hemorrhage, extraaxial collection, or midline shift.  No CT evidence of acute infarct. Normal   parenchymal attenuation. Normal ventricles and sulci.     VISUALIZED ORBITS/SINUSES/MASTOIDS: No intraorbital abnormality. No significant paranasal sinus mucosal disease. No middle ear or mastoid effusion.    BONES/SOFT TISSUES: No acute abnormality.    HEAD CTA:  ANTERIOR CIRCULATION: No stenosis/occlusion, aneurysm, or high flow vascular malformation. Fetal origin of both posterior cerebral arteries from the anterior circulation.    POSTERIOR CIRCULATION: Mild luminal irregularity of the right V4 abutting the hyperdensity in the right premedullary cistern on corresponding noncontrast CT head. The V4 appears to slightly wraparound the rounded hyperdensity (series 5, image 113). The   rounded hyperdensity is nonopacified. No significant stenosis. No occlusion or high flow vascular malformation.      DURAL VENOUS SINUSES:  Expected enhancement of the major dural venous sinuses.    NECK CTA:  RIGHT CAROTID: No measurable stenosis or dissection.    LEFT CAROTID: No measurable stenosis or dissection.    VERTEBRAL ARTERIES: No focal stenosis or dissection. Balanced vertebral arteries.    AORTIC ARCH: Classic aortic arch anatomy with no significant stenosis at the origin of the great vessels.    NONVASCULAR STRUCTURES: Unremarkable.      Impression    IMPRESSION:   HEAD CT:  1.  No acute intracranial process.  2.  13 mm hyperdensity in the right premedullary cistern with mild mass effect on the medulla, described in the CTA section below.    HEAD CTA:   1.  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 Contrast enhanced brain MRI and MRA head recommended to further characterize.  2.  No large vessel occlusion or hemodynamically significant stenosis.    NECK CTA:  1.  No large vessel occlusion or hemodynamically significant stenosis.   MR Brain w/o & w Contrast    Narrative    EXAM: MR BRAIN W/O and W CONTRAST, MRA BRAIN ([place]) W/O CONTRAST  LOCATION: [facility]  DATE: 2/13/2024    INDICATION: Transient double vision and dysconjugate gaze. Evaluate for CVA/TIA mass.  COMPARISON: CT/CTA same day  CONTRAST: 7 mL Gadavist  TECHNIQUE:   1) Routine multiplanar multisequence head MRI without and with intravenous contrast.  2) 3D time-of-flight head MRA without intravenous contrast.    FINDINGS:  HEAD MRI:  INTRACRANIAL CONTENTS: No acute or subacute infarct. Corresponding to the lesion demonstrated on the recent head CT/CTA, there is a 13 mm in diameter lesion which appears to be intimately associated with the distal right vertebral artery flow void. It   demonstrates heterogeneous enhancement on postcontrast imaging as well as blooming on the GRE sequence. Appearance is most consistent with a partially thrombosed aneurysm. Normal brain parenchymal signal. Normal ventricles and  sulci. Normal position of   the cerebellar tonsils. No additional pathologic enhancement.    SELLA: No abnormality accounting for technique.    OSSEOUS STRUCTURES/SOFT TISSUES: Normal marrow signal. The major intracranial vascular flow voids are maintained.     ORBITS: No abnormality accounting for technique.     SINUSES/MASTOIDS: No paranasal sinus mucosal disease. No middle ear or mastoid effusion.     HEAD MRA:   ANTERIOR CIRCULATION: No stenosis/occlusion, aneurysm, or high flow vascular malformation. Standard [place] anatomy.    POSTERIOR CIRCULATION: Fusiform dilatation of the intracranial right vertebral artery relating to the above-described partially thrombosed 13 mm aneurysm. Dominant right and smaller left vertebral artery contribute to a normal basilar artery.       Impression    IMPRESSION:  HEAD MRI:   1.  Corresponding to the abnormality on the recent head CT/CTA, there is evidence of a 13 mm diameter partially thrombosed aneurysm associated with the intracranial right vertebral artery.  2.  The intracranial contents are otherwise unremarkable.    HEAD MRA:   1.  13 mm partially thrombosed presumed dissecting aneurysm associated with the V4 segment of the right vertebral artery.    NOTE: ABNORMAL REPORT    THE DICTATION ABOVE DESCRIBES AN ABNORMALITY FOR WHICH FOLLOW-UP IS NEEDED.    MRA Angiogram Head w/o Contrast    Narrative    EXAM: MR BRAIN W/O and W CONTRAST, MRA BRAIN ([place]) W/O CONTRAST  LOCATION: [facility]  DATE: 2/13/2024    INDICATION: Transient double vision and dysconjugate gaze. Evaluate for CVA/TIA mass.  COMPARISON: CT/CTA same day  CONTRAST: 7 mL Gadavist  TECHNIQUE:   1) Routine multiplanar multisequence head MRI without and with intravenous contrast.  2) 3D time-of-flight head MRA without intravenous contrast.    FINDINGS:  HEAD MRI:  INTRACRANIAL CONTENTS: No acute or subacute infarct. Corresponding to the lesion demonstrated on the  recent head CT/CTA, there is a 13 mm in diameter lesion which appears to be intimately associated with the distal right vertebral artery flow void. It   demonstrates heterogeneous enhancement on postcontrast imaging as well as blooming on the GRE sequence. Appearance is most consistent with a partially thrombosed aneurysm. Normal brain parenchymal signal. Normal ventricles and sulci. Normal position of   the cerebellar tonsils. No additional pathologic enhancement.    SELLA: No abnormality accounting for technique.    OSSEOUS STRUCTURES/SOFT TISSUES: Normal marrow signal. The major intracranial vascular flow voids are maintained.     ORBITS: No abnormality accounting for technique.     SINUSES/MASTOIDS: No paranasal sinus mucosal disease. No middle ear or mastoid effusion.     HEAD MRA:   ANTERIOR CIRCULATION: No stenosis/occlusion, aneurysm, or high flow vascular malformation. Standard [place] anatomy.    POSTERIOR CIRCULATION: Fusiform dilatation of the intracranial right vertebral artery relating to the above-described partially thrombosed 13 mm aneurysm. Dominant right and smaller left vertebral artery contribute to a normal basilar artery.       Impression    IMPRESSION:  HEAD MRI:   1.  Corresponding to the abnormality on the recent head CT/CTA, there is evidence of a 13 mm diameter partially thrombosed aneurysm associated with the intracranial right vertebral artery.  2.  The intracranial contents are otherwise unremarkable.    HEAD MRA:   1.  13 mm partially thrombosed presumed dissecting aneurysm associated with the V4 segment of the right vertebral artery.    NOTE: ABNORMAL REPORT    THE DICTATION ABOVE DESCRIBES AN ABNORMALITY FOR WHICH FOLLOW-UP IS NEEDED.    Echocardiogram Complete w Bubble Study - For age < 60 yrs     Value    LVEF  60-65%    Narrative    [id number]  [id number]  [id number]  [id number]^RIZWANA^MELYSSA^MELISSA     [facility]  Echocardiography Laboratory  [facility] [place] [address]     Name: BELLO ZAMBRANO  MRN: [id number]  : 1975  Study Date: 2024 11:02 AM  Age: 48 yrs  Gender: Male  Patient Location: [facility]  Reason For Study: Cerebrovascular Incident  Ordering Physician: MELYSSA WAGONER  Referring Physician: MELYSSA WAGONER  Performed By: Karri Viveros     BSA: 1.8 m2  Height: 67 in  Weight: 150 lb  HR: 69  ______________________________________________________________________________  Procedure  Complete Bubble Echo Adult.  ______________________________________________________________________________  Interpretation Summary     The left ventricle is normal in size.  The visual ejection fraction is 60-65%.  No regional wall motion abnormalities noted.  Normal cardiac valves.  The right ventricle is normal in size and function.  There is no color Doppler evidence of an atrial shunt.  A contrast injection (Bubble Study) was performed that was negative for flow  across the interatrial septum.  ______________________________________________________________________________  Left Ventricle  The left ventricle is normal in size. There is normal left ventricular wall  thickness. The visual ejection fraction is 60-65%. No regional wall motion  abnormalities noted.     Right Ventricle  The right ventricle is normal in size and function.     Atria  Normal left atrial size. Right atrial size is normal. There is no color  Doppler evidence of an atrial shunt. A contrast injection (Bubble Study) was  performed that was negative for flow across the interatrial septum.     Mitral Valve  The mitral valve is normal in structure and function. There is trace mitral  regurgitation.     Tricuspid Valve  There is trace tricuspid regurgitation. IVC diameter <2.1 cm collapsing >50%  with sniff suggests a normal RA pressure of 3 mmHg.     Aortic Valve  Normal tricuspid aortic valve. No aortic regurgitation is present.     Pulmonic Valve  There is trace pulmonic valvular  regurgitation.     Vessels  Normal size aorta. The aortic root is normal size.     Pericardium  There is no pericardial effusion.     Rhythm  Sinus rhythm was noted.  ______________________________________________________________________________  MMode/2D Measurements & Calculations     IVSd: 0.83 cm  LVIDd: 4.1 cm  LVIDs: 2.8 cm  LVPWd: 0.79 cm  FS: 30.2 %  LV mass(C)d: 97.5 grams  LV mass(C)dI: 54.5 grams/m2  Ao root diam: 3.0 cm  asc Aorta Diam: 2.7 cm  LVOT diam: 2.0 cm  LVOT area: 3.1 cm2  Ao root diam index Ht(cm/m): 1.8  Ao root diam index BSA (cm/m2): 1.7  Asc Ao diam index BSA (cm/m2): 1.5  Asc Ao diam index Ht(cm/m): 1.6  LA Volume (BP): 31.8 ml     LA Volume Index (BP): 17.8 ml/m2  RWT: 0.39  TAPSE: 2.0 cm     Doppler Measurements & Calculations  MV E max sam: 66.7 cm/sec  MV A max sam: 67.6 cm/sec  MV E/A: 0.99  MV dec slope: 364.5 cm/sec2  MV dec time: 0.18 sec  Ao V2 max: 146.4 cm/sec  Ao max P.0 mmHg  Ao V2 mean: 99.1 cm/sec  Ao mean P.3 mmHg  Ao V2 VTI: 29.7 cm  EDDIE(I,D): 2.5 cm2  EDDIE(V,D): 2.4 cm2  LV V1 max P.2 mmHg  LV V1 max: 114.4 cm/sec  LV V1 VTI: 24.3 cm  SV(LVOT): 75.1 ml  SI(LVOT): 42.0 ml/m2  PA V2 max: 111.3 cm/sec  PA max P.0 mmHg  PA acc time: 0.12 sec  AV Sam Ratio (DI): 0.78  EDDIE Index (cm2/m2): 1.4  E/E' av.9     Lateral E/e': 5.6  Medial E/e': 8.2     ______________________________________________________________________________  Report approved by: Denis Martinez 2024 01:08 PM

## 2024-02-15 NOTE — PROGRESS NOTES
Pt here with Cerebral aneurysm, cerebral angio. A&OX4. Neuros intact. VSS- BP <140. Tele NSR. Regular diet, thin liquids. Takes pills whole with water. Up independent. Right groin site pain- refused PRNs. Pt scoring green on the Aggression Stop Light Tool. Plan to discharge home tomorrow after Neuro surg assess patient.

## 2024-02-15 NOTE — PROGRESS NOTES
Pt admitted with episodes of double vision and light head sensation, found intradural R vertebral artery dissection and  aneurysm. R groin site CDI, soft, slight tenderness, unchanged. A&O x4. VSS, SMP<140, on RA. LS clear. Tele NSR. CMS and Neuro's intact. Denies pain. Independent. Pt scoring green on Aggression Stop Light Tool. Plans to discharge home today with OP OT.  Discharge follow-up care and med teaching given

## 2024-02-15 NOTE — CONSULTS
Per chart review CM consulted for discharge planning/disposition.  Patient will discharge to home today.  He has been independent.  Neurology has placed referrals for follow up.  He has a PCP listed as Moreno Silva MD.  No CM interventions anticipated.  Please reconsult CM if specific discharge needs identified.    Lisbet Byrne RN, BSN, PHN  Inpatient Care Coordination  [facility]  Phone: [phone]

## 2024-02-15 NOTE — PLAN OF CARE
Goal Outcome Evaluation:      Plan of Care Reviewed With: patient    Overall Patient Progress: improvingOverall Patient Progress: improving    Patient is here with vertebral artery aneurysm ,s/p cerebral angio. VSS . Neuro's intact except mild HA  managed with tylenol.Tele NSR . Groin incision dressing CDI .On Regular diet.Up independently voided adequately.Plan to discharge to home today.

## 2024-02-16 ENCOUNTER — TELEPHONE (OUTPATIENT)
Dept: NEUROSURGERY | Facility: CLINIC | Age: 49
End: 2024-02-16
Payer: COMMERCIAL

## 2024-02-17 ENCOUNTER — PATIENT OUTREACH (OUTPATIENT)
Dept: CARE COORDINATION | Facility: CLINIC | Age: 49
End: 2024-02-17
Payer: COMMERCIAL

## 2024-02-17 NOTE — PROGRESS NOTES
Connected Care Resource Center:   [facility] Contact  [facility]/Voicemail     Clinical Data: Post-Discharge Outreach     Outreach attempted x 2.  Left message on patient's voicemail, providing [facility]'s central phone number of [phone] ([phone]) for questions/concerns and/or to schedule an appt with an [facility] provider, if they do not have a PCP.      Plan:  [facility] will do no further outreaches at this time.       Tash Lopez MA  Connected Care Resource Center, [facility]    *Connected Care Resource Team does NOT follow patient ongoing. Referrals are identified based on internal discharge reports and the outreach is to ensure patient has an understanding of their discharge instructions.

## 2024-02-19 ENCOUNTER — TELEPHONE (OUTPATIENT)
Dept: NEUROSURGERY | Facility: CLINIC | Age: 49
End: 2024-02-19
Payer: COMMERCIAL

## 2024-02-19 NOTE — TELEPHONE ENCOUNTER
RECORDS RECEIVED FROM: Internal    REASON FOR VISIT: TIA (transient ischemic attack)/ Dissection of intracranial vertebral artery/ Aneurysm   PROVIDER: Larry Royal MD   DATE OF APPT: 2/20/24 @ 9:30 am    NOTES (FOR ALL VISITS) STATUS DETAILS   OFFICE NOTE from referring provider Internal [facility] Referral    OFFICE NOTE from other specialist Internal 2/16/24 [facility]  [facility]     DISCHARGE SUMMARY from hospital Internal 2/13/24-2/15/24 Emerita Bunch MD @[facility]     MEDICATION LIST Internal    IMAGING  (FOR ALL VISITS)     IR Internal [facility]  2/14/24 IR Carotid Cerebral Angio Bilat     MRI (HEAD, NECK, SPINE) Internal [facility]  2/13/24 MRA Brain ([facility])   2/13/24 MR Brain     CT (HEAD, NECK, SPINE) Internal [facility]  2/13/24 CTA Head Neck  2/13/24 CT Head

## 2024-02-19 NOTE — TELEPHONE ENCOUNTER
FUTURE VISIT INFORMATION      FUTURE VISIT INFORMATION:  Date: 3/13/2024  Time: 7:30 AM  Location: [facility]  REFERRAL INFORMATION:  Referring provider: Dr. Villanueva  Referring providers clinic: [facility] - Admission  Reason for visit/diagnosis: Diplopia [H53.2], Evaluation for eyestrain and misalignment     RECORDS REQUESTED FROM:       Clinic name Comments Records Status Imaging Status   [facility]alth ([facility]) 2/20/24 - NEUROSURG OV with Dr. Genny Pacheco    [facility] 2/13/24 - Admission with Dr. Villanueva [facility] - Imaging 2/13/24 - MRA Brain COW  2/13/24 - MRI Brain  2/13/24 - CT Head/Neck  2/13/24 - CT Head [facility] PACs   [facility] 2/16/24 - NEURO OV [facility] Dr[facility] Crews [facility]

## 2024-02-19 NOTE — TELEPHONE ENCOUNTER
[facility] Call Center    Phone Message    May a detailed message be left on voicemail: yes     Reason for Call: Other: Patient's spouse is calling regarding tomorrows video visit that needs to be changed. She wants to know why its being changed from video to inperson and would like a call back.       Action Taken: Other: Neurosurgery    Travel Screening: Not Applicable

## 2024-02-20 ENCOUNTER — VIRTUAL VISIT (OUTPATIENT)
Dept: NEUROSURGERY | Facility: CLINIC | Age: 49
End: 2024-02-20
Payer: COMMERCIAL

## 2024-02-20 ENCOUNTER — PRE VISIT (OUTPATIENT)
Dept: NEUROSURGERY | Facility: CLINIC | Age: 49
End: 2024-02-20

## 2024-02-20 VITALS — HEIGHT: 67 IN | WEIGHT: 150 LBS | BODY MASS INDEX: 23.54 KG/M2

## 2024-02-20 DIAGNOSIS — I72.6 ANEURYSM OF RIGHT VERTEBRAL ARTERY (H): ICD-10-CM

## 2024-02-20 DIAGNOSIS — I67.1 CEREBRAL ANEURYSM: Primary | ICD-10-CM

## 2024-02-20 PROCEDURE — 99203 OFFICE O/P NEW LOW 30 MIN: CPT | Mod: 95 | Performed by: NEUROLOGICAL SURGERY

## 2024-02-20 ASSESSMENT — PAIN SCALES - GENERAL: PAINLEVEL: MILD PAIN (2)

## 2024-02-20 NOTE — NURSING NOTE
Is the patient currently in the state of [place]? YES    Visit mode:VIDEO    If the visit is dropped, the patient can be reconnected by: VIDEO VISIT: Send to e-mail at: [email or web address]    Will anyone else be joining the visit? NO  (If patient encounters technical issues they should call [phone])    How would you like to obtain your AVS? MyChart    Are changes needed to the allergy or medication list? No    Reason for visit: Consult    Aruna ROMERO

## 2024-02-20 NOTE — LETTER
2/20/2024       RE: Silas Lima  [address]  [place] [address]     Dear Colleague,    Thank you for referring your patient, Silas Lima, to the [facility]OS[facility] [place] [facility]. Please see a copy of my visit note below.    Moreno Silva MD   [address] [place] [address]     Dear Dr. Silva:    We saw Mr. 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  He recently presented with a transient episode of diplopia.  His neurological examination was normal.  Imaging studies did not show an acute stroke.  However, a mostly thrombosed aneurysm of the right V4 segment was identified.  We further characterized this on a diagnostic cerebral angiogram, which he tolerated well, on 2/14/2024.  The angiogram showed no filling of the saccular component of the aneurysm.  However, there is a long length of fusiform, irregular dilatation suggestive of a dissecting aneurysm.  The MRI shows minimal mass effect on the medulla.    He was accompanied by his wife on this visit.  He has been doing well since discharge from the hospital.  He denies any headaches and has not had any recurrent episodes of diplopia.  He is on daily aspirin and clopidogrel, both of which we started in anticipation of endovascular treatment of this aneurysm.  He is not having any bruising or bleeding.    Over the video platform, his general appearance is good.  His gross neurological examination remains normal.  NIH stroke scale score is 0.    We went over the main management options including endovascular reconstruction of the right vertebral artery, endovascular occlusion of the involved segment, and observation.  We recommend treatment given the unpredictable nature of these aneurysms and the potential of  thromboembolism.  However endovascular reconstruction is not straightforward due to the long length of the dissection and the width of the vessel.  We described an overlapping flow diverter construct.  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  We discussed the risks of stroke (altered sensorium, motor deficits, dysphagia, dysphonia, gait instability/incoordination), intraoperative aneurysm rupture, arterial injury, groin hematoma, and need for retreatment.  We also discussed the prolonged use of dual antiplatelet therapy.  He is leaning towards treatment but would like more time to review our discussion.  For now, he prefers to remain on the dual antiplatelet therapy rather than just single therapy with aspirin.  We asked him to contact us if he develops any bleeding complications in the interim.  Our team will check back with them later this week to answer any questions.    He should continue to follow with you for optimal control of hypertension.  Please not hesitate contact us with questions.  We will keep you informed of his progress.        Again, thank you for allowing me to participate in the care of your patient.      Sincerely,    Larry Royal MD

## 2024-02-20 NOTE — PROGRESS NOTES
Virtual Visit Details    Type of service:  Video Visit     Originating Location (pt. Location): Home    Distant Location (provider location):  On-site  Platform used for Video Visit: Parisa  Date of service: 2/20/2024  Visit length: 30 minutes    Moreno Silva MD   [address] [place] [address]       Dear Dr. Silva:    We saw Mr. 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  He recently presented with a transient episode of diplopia.  His neurological examination was normal.  Imaging studies did not show an acute stroke.  However, a mostly thrombosed aneurysm of the right V4 segment was identified.  We further characterized this on a diagnostic cerebral angiogram, which he tolerated well, on 2/14/2024.  The angiogram showed no filling of the saccular component of the aneurysm.  However, there is a long length of fusiform, irregular dilatation suggestive of a dissecting aneurysm.  The MRI shows minimal mass effect on the medulla.    He was accompanied by his wife on this visit.  He has been doing well since discharge from the hospital.  He denies any headaches and has not had any recurrent episodes of diplopia.  He is on daily aspirin and clopidogrel, both of which we started in anticipation of endovascular treatment of this aneurysm.  He is not having any bruising or bleeding.    Over the video platform, his general appearance is good.  His gross neurological examination remains normal.  NIH stroke scale score is 0.    We went over the main management options including endovascular reconstruction of the right vertebral artery, endovascular occlusion of the involved segment, and observation.  We recommend treatment given the unpredictable nature of these aneurysms and the potential of thromboembolism.  However endovascular reconstruction is not  straightforward due to the long length of the dissection and the width of the vessel.  We described an overlapping flow diverter construct.  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  We discussed the risks of stroke (altered sensorium, motor deficits, dysphagia, dysphonia, gait instability/incoordination), intraoperative aneurysm rupture, arterial injury, groin hematoma, and need for retreatment.  We also discussed the prolonged use of dual antiplatelet therapy.  He is leaning towards treatment but would like more time to review our discussion.  For now, he prefers to remain on the dual antiplatelet therapy rather than just single therapy with aspirin.  We asked him to contact us if he develops any bleeding complications in the interim.  Our team will check back with them later this week to answer any questions.    He should continue to follow with you for optimal control of hypertension.  Please not hesitate contact us with questions.  We will keep you informed of his progress.    Sincerely,    Larry Royal MD  Department of Neurosurgery

## 2024-02-20 NOTE — TELEPHONE ENCOUNTER
Health Call Center    Phone Message    May a detailed message be left on voicemail: yes     Reason for Call: Other: Patient's spouse Blanca is wondering if virtual appointment is still on for today or if patient should be seen in person, she states in-person is okay but they cannot take 3pm tomorrow due an existing appointment. Please call her to discuss [phone].      Action Taken: Message routed to:  [facility] ([facility] Neurosurgery    Travel Screening: Not Applicable

## 2024-02-22 ENCOUNTER — TELEPHONE (OUTPATIENT)
Dept: NEUROLOGY | Facility: CLINIC | Age: 49
End: 2024-02-22

## 2024-02-22 ENCOUNTER — OFFICE VISIT (OUTPATIENT)
Dept: OTHER | Facility: CLINIC | Age: 49
End: 2024-02-22
Attending: INTERNAL MEDICINE
Payer: COMMERCIAL

## 2024-02-22 ENCOUNTER — TELEPHONE (OUTPATIENT)
Dept: NEUROSURGERY | Facility: CLINIC | Age: 49
End: 2024-02-22
Payer: COMMERCIAL

## 2024-02-22 VITALS
OXYGEN SATURATION: 98 % | DIASTOLIC BLOOD PRESSURE: 90 MMHG | WEIGHT: 146.8 LBS | HEART RATE: 84 BPM | BODY MASS INDEX: 22.99 KG/M2 | SYSTOLIC BLOOD PRESSURE: 136 MMHG

## 2024-02-22 DIAGNOSIS — G45.9 TIA (TRANSIENT ISCHEMIC ATTACK): ICD-10-CM

## 2024-02-22 DIAGNOSIS — I10 BENIGN ESSENTIAL HYPERTENSION: ICD-10-CM

## 2024-02-22 DIAGNOSIS — I72.6 VERTEBRAL ARTERY ANEURYSM (H): Primary | ICD-10-CM

## 2024-02-22 DIAGNOSIS — E78.5 HYPERLIPIDEMIA LDL GOAL <70: ICD-10-CM

## 2024-02-22 DIAGNOSIS — R73.01 IFG (IMPAIRED FASTING GLUCOSE): ICD-10-CM

## 2024-02-22 DIAGNOSIS — I67.1 CEREBRAL ANEURYSM: Primary | ICD-10-CM

## 2024-02-22 PROCEDURE — 99205 OFFICE O/P NEW HI 60 MIN: CPT | Performed by: INTERNAL MEDICINE

## 2024-02-22 PROCEDURE — G2211 COMPLEX E/M VISIT ADD ON: HCPCS | Performed by: INTERNAL MEDICINE

## 2024-02-22 PROCEDURE — 99417 PROLNG OP E/M EACH 15 MIN: CPT | Performed by: INTERNAL MEDICINE

## 2024-02-22 PROCEDURE — 99214 OFFICE O/P EST MOD 30 MIN: CPT | Performed by: INTERNAL MEDICINE

## 2024-02-22 NOTE — PATIENT INSTRUCTIONS
Continue current medications    Monitor BP     Plan for CTA of chest abdomen and pelvis with leg runoff , our staff will schedule     Virtual or office visit after CTA

## 2024-02-22 NOTE — PROGRESS NOTES
[facility]        Patient is here for a consult.    Pt is currently taking Aspirin, Statin, and Plavix.    BP (!) 136/90 (BP Location: Left arm, Patient Position: Chair, Cuff Size: Adult Regular)   Pulse 84   Wt 146 lb 12.8 oz (66.6 kg)   SpO2 98%   BMI 22.99 kg/m      The provider has been notified that the patient has no concerns.     Questions patient would like addressed today are: N/A.    Refills are needed: N/A    Has homecare services and agency name:  Victoria Longoria MA

## 2024-02-22 NOTE — TELEPHONE ENCOUNTER
Per 2/20/24 visit with Dr. Royal:    Patient is leaning towards treatment with flow diversion. On aspirin and Plavix. If he agrees to treatment, he would like to try for next week as he has family in town for support. Will ask our team to look at dates under general anesthesia.     Spoke with patient. States he is still debating options. Wondering how soon he could schedule. Will likely make up mind by end of day tomorrow.     What would be the right time for next scan if he does not proceed with treatment. Then, if no improvement, proceed?     Should he continue on dual anti-platelet therapy if not treated right away.    Continue on 81 aspirin only if he does not proceed with treatment now?    Will decrease other activities and not work - would like to continue dual anti-platelet therapy until 6 week scan if he decides not treat now.     Will consult with Dr. Royal on patients questions.   Will place order and have scheduling look for soonest date to proceed.     Patient verbalized understanding. Contact information provided and encouraged to call with questions/concerns.    Karon Arguelles RN 2/22/2024 1:17 PM

## 2024-02-22 NOTE — TELEPHONE ENCOUNTER
Per Dr. Royal - if patient decides not to proceed would plan for MRI/MRA in 6 weeks and then extend the duration to 3 months, 6 months, then a year. We can do dual anti-platelet therapy for 6 weeks, then if MRI is stable, just aspirin.     Informed patient of above. Patient request to get MRI/MRA on the schedule now in the event that he decides not to proceed with treatment.     Patient will call [phone] to schedule scans at [facility].     Patient prefers procedure to be scheduled at [facility].     Karon Arguelles RN 2/22/2024 1:38 PM

## 2024-02-22 NOTE — PATIENT INSTRUCTIONS
We will contact you to discuss proceeding with treatment with flow diversion.     Stroke & Endovascular RN Care Coordinators:    Jacqueline Burks, RN, BSN  Karon Arguelles, RN, CNRN, SCRN    If you have any questions please contact the RN Care Coordinators at [phone], option 1.     After business hours call the  at [phone] and have the Neuro-Interventional Fellow paged.    Thank you for choosing [facility] for your health care needs.

## 2024-02-22 NOTE — TELEPHONE ENCOUNTER
Patient left a voicemail requesting to schedule surgery with Dr. Genny Camacho, Perioperative Coordinator 2/22/2024 at 9:22 AM

## 2024-02-22 NOTE — PROGRESS NOTES
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      HPI: Silas Lima is a 48 year old year old patient very pleasant male originally from [place]  works for [employer] on February 13, 2024 working from home on his computer around noon he suddenly felt lightheaded sensation and double vision lasted for 5 minutes and his wife noticed his gait was lagging or disconjugate during that time.  He had another episode the same day and he came to the emergency room neuroexam was nonfocal but he underwent extensive evaluation including the head and neck CTA, MRI of the head etc..  CT and MRI showed evidence of 13 mm diameter partially thrombosed aneurysm associated with intracranial right vertebral artery likely at the PICA origin  He underwent DSA on February 14, 2024 right V4 segment dissecting thrombosed aneurysm was noted.  He was initiated dual antiplatelet agent since then aspirin with Plavix tolerating without any problems.  Baseline LDL was 125 initiated atorvastatin 80 mg daily.  He gives a history of sometime in 2021 he developed neck pain he underwent chiropractic neck manipulation and after the manipulation he developed vertigo it was self resolved.  Then followed by he travel to [facility] in 2022 developed severe neck pain in the flight after he came back seen chiropractor couple times.  No other neck injury or motor vehicle accident  He has a history of hypertension taking amlodipine and this was adjusted during recent hospitalization and home blood pressure readings are  well-controlled    After the discharge from the hospital he was seen and evaluated at [facility] and then at [facility].  He was seen yesterday by Dr. Royal neurosurgeon at [facility] and trying to decide the timing of flow diversion procedure under general anesthesia.    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      PAST SURGICAL HISTORY:  History reviewed. No pertinent surgical history.    ALLERGIES   No Known Allergies    FAMILY HISTORY  Family History   Problem Relation Age of Onset    C.A.D. Father     C.A.D. Paternal Grandfather     Heart Disease Father     Heart Disease Paternal Grandfather     Diabetes Father     Diabetes Maternal Grandmother     Diabetes Paternal Grandfather     Hypertension Paternal Grandmother     Hypertension Paternal Grandfather     Cerebrovascular Disease Paternal Grandfather     Lipids Paternal Grandfather        VASCULAR FAMILY HISTORY  1st order relative with atherosclerotic PAD: No  1st order relative with AAA: No  Family history of Familial Hyperlipidemia No  Family History of Hypercoagulable state:No    VASCULAR RISK FACTORS  1. Diabetes:No   2. Smoking: has never smoked.  3. HTN:  controlled  4.Hyperlipidemia: Yes - controlled      SOCIAL HISTORY  Social History     Socioeconomic History    Marital status:      Spouse name: Not on file    Number of children: 0    Years of education: Not on file    Highest education level: Not on file   Occupational History    Occupation:       Employer: [employer]   Tobacco Use    Smoking status: Never    Smokeless tobacco: Never   Substance and Sexual Activity    Alcohol use: Yes     Comment: 1-2 per wk    Drug use: No    Sexual activity: Yes     Partners: Female   Other Topics Concern    Not on file   Social History Narrative    Not on file     Social Determinants of Health     Financial Resource Strain: Not on file   Food Insecurity: Not on file   Transportation Needs: Not on file   Physical Activity: Not on file   Stress: Not on file   Social Connections: Not on file   Interpersonal Safety: Not on file   Housing Stability: Not on file       ROS:   General: No change in weight, sleep or appetite.  Normal energy.  No fever or chills  Eyes: Negative for vision changes or eye problems  ENT: No problems with ears, nose or throat.  No difficulty swallowing.  Resp: No coughing, wheezing or shortness of breath  CV: No chest pains or palpitations  GI: No nausea, vomiting,  heartburn, abdominal pain, diarrhea, constipation or change in bowel habits  : No urinary frequency or dysuria, bladder or kidney problems  Musculoskeletal: No significant muscle or joint pains  Neurologic: On February 14, 2024 was admitted to the hospital with sudden history of lightheaded sensation and double vision for 5 minutes and also his wife noticed Ray was lagging or disconjugate during that time and he has a recurrent episode  Psychiatric: No problems with anxiety, depression or mental health  Heme/immune/allergy: No history of bleeding or clotting problems or anemia.  No allergies or immune system problems  Endocrine: No history of thyroid disease, diabetes or other  endocrine disorders  Skin: No rashes,worrisome lesions or skin problems  Vascular:  No claudication, lifestyle limiting or otherwise; no ischemic rest pain; no non-healing ulcers. No weakness, No loss of sensation      EXAM:  BP (!) 136/90 (BP Location: Left arm, Patient Position: Chair, Cuff Size: Adult Regular)   Pulse 84   Wt 146 lb 12.8 oz (66.6 kg)   SpO2 98%   BMI 22.99 kg/m    In general, the patient is a pleasant male in no apparent distress.    HEENT: NC/AT.  PERRLA.  EOMI.  Sclerae white, not injected.  Nares clear.  Pharynx without erythema or exudate.  Dentition intact.    Neck: No adenopathy.  No thyromegaly. Carotids +2/2 bilaterally without bruits.  No jugular venous distension.   Heart: RRR. Normal S1, S2 splits physiologically. No murmur, rub, click, or gallop. The PMI is in the 5th ICS in the midclavicular line. There is no heave.    Lungs: CTA.  No ronchi, wheezes, rales.  No dullness to percussion.   Abdomen: Soft, nontender, nondistended. No organomegaly. No AAA.  No bruits.   Extremities: No clubbing, cyanosis, or edema.  No wounds.       Labs:  LIPID RESULTS:  Lab Results   Component Value Date    CHOL 202 (H) 02/14/2024    CHOL 208 (H) 02/14/2024    HDL 37 (L) 02/14/2024    HDL 37 (L) 02/14/2024     (H) 02/14/2024     (H) 02/14/2024    TRIG 201 (H) 02/14/2024    TRIG 193 (H) 02/14/2024       LIVER ENZYME RESULTS:  Lab Results   Component Value Date    AST 26 02/01/2024    AST 55 01/25/2006    ALT 31 02/01/2024    ALT 57 01/25/2006       CBC RESULTS:  Lab Results   Component Value Date    WBC 7.9 02/14/2024    WBC 12.5 (H) 01/25/2006    RBC 4.71 02/14/2024    RBC 4.62 01/25/2006    HGB 14.2 02/14/2024    HGB 13.4 01/25/2006    HCT 40.9 02/14/2024    HCT 39.9 (L) 01/25/2006    MCV 87 02/14/2024    MCV 86 01/25/2006    MCH 30.1 02/14/2024    MCH 29.0 01/25/2006    MCHC 34.7 02/14/2024    MCHC 33.6 01/25/2006    RDW 12.0 02/14/2024    RDW 13.5 01/25/2006     02/14/2024      01/25/2006       BMP RESULTS:  Lab Results   Component Value Date     02/14/2024     01/25/2006    POTASSIUM 3.9 02/14/2024    POTASSIUM 3.8 01/25/2006    CHLORIDE 107 02/14/2024    CHLORIDE 99 01/25/2006    CO2 24 02/14/2024    CO2 27 01/25/2006    ANIONGAP 10 02/14/2024    ANIONGAP 13 01/25/2006     (H) 02/14/2024     (H) 02/14/2024     (H) 01/25/2006    BUN 8.3 02/14/2024    BUN 13 01/25/2006    CR 0.78 02/14/2024    CR 0.95 01/25/2006    GFRESTIMATED >90 02/14/2024    GFRESTIMATED >80 01/25/2006    GFRESTBLACK >80 01/25/2006    JENIFER 9.0 02/14/2024    JENIFER 8.9 01/25/2006        A1C RESULTS:  Lab Results   Component Value Date    A1C 5.8 (H) 02/13/2024         Procedures:     MRI/Head CT - CT: 13 mm hyperdensity in the right premedullary cistern with mild mass effect on the medulla   - MRI: Corresponding to the abnormality on the recent head CT/CTA, there is evidence of a 13 mm diameter partially thrombosed aneurysm associated with the intracranial right vertebral artery.    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      MRA - 13 mm partially thrombosed presumed dissecting aneurysm associated with the V4 segment of the right vertebral artery.      DSA 2/14: Right V4 segment dissecting thrombosed aneurysm    Cervical Vasculature No large vessel occlusion or hemodynamically significant stenosis       Echocardiogram 65% EF, No regional wall motion  abnormalities, normal atrial sizes,  normal left ventricular wall thickness   EKG/Telemetry SR   Other Testing Not Applicable       LDL  2/14/2024: 125 mg/dL   A1C  2/13/2024: 5.8 %   Troponin 2/13/2024: <6 ng/L     Cerebral Angiography Report   [id number]  BELLO ZAMBRANO  1975 2/14/2024 2:15 PM     Brief History:  48-year-old man with history of hypertension presenting with acute  onset posterior circulation symptoms that are now completely resolved.  These symptoms  include vertigo, double vision that lasted for about 5  minutes and disconjugate gaze noted on examination by wife. Brain  imaging at this far reveals a thrombosed aneurysm of the right  vertebral artery V4 segment which measures at least 13 mm on MRI T2  sequence.     Indication for the procedure: Evaluation of intracranial aneurysm.     Attending/s: Larry Royal MD  Fellow/s: Toby Mcgraw MD, Coral Hu MD     Anesthesia: Local anesthesia and conscious sedation  Fluoro time: 10 minutes  Fluoro dose: 292 mGy  Contrast used: 40 mL of Visi-320  Sedation time: 30 minutes of 1:1 sedation monitoring by the physician  Sedatives: IV Fentanyl 100 mcg, IV Midazolam 2 mg.   Other Medications: Subcutaneous 1% lidocaine, heparin 5000 units     Procedures:  1.  Diagnostic cervicocerebral angiography and interpretation of the  images.  2.  Right common femoral arteriotomy  3.  Diagnostic angiography of the right common femoral artery.  4.  Selective catheterization and diagnostic cerebral angiography of  the right vertebral artery, left vertebral artery  5.  Percutaneous closure of the right femoral arteriotomy by using 6F  Angioseal Closure device..     Devices and/or Implants Used:  Holcomb tip H1 catheter  0.035 inch Glidewire  Angle taper catheter  5 Austrian short sheath     Consent:   The risks and benefits of a conventional diagnostic cerebral  angiography were discussed with the patient and/or patient's family at  the bedside who agreed to proceed. The risks, which were discussed  included risk of stroke, arterial dissection, groin hematoma,  arteriovenous fistula in the groin and pseudoaneurysm of the femoral  artery. If and when radial artery approach is used, the risks  discussed included radial artery occlusion, hand ischemia. Verbal and  written informed consent was obtained.     Description of Procedure:  Patient was brought to the angiography suite and placed in supine  position. Medications were  administered by the radiology nursing  staff, and the vital signs were monitored throughout procedure under  1:1 physician monitoring. The patient was prepped and draped in a  sterile fashion. A timeout was performed prior to start of the  procedure.      The right common femoral artery was palpated using standard anatomic  landmarks. 1% of lidocaine was injected locally and a small incision  was made on the skin overlying the femoral artery using a 11-blade  scalpel. A 21-gauge needle was placed into the right femoral artery,  which was then exchanged for a 4-Colombian dilator over a microwire. The  4-Colombian dilator was then exchanged for a 5-Colombian sheath over a  J-wire using the modified Seldinger's technique. The sheath was  connected to a continuous flush of heparinized saline. We initially  attempted catheterization of the right vertebral artery using the  angled taper catheter. However, due to a type III arch we changed to  an H1 catheter. We then catheterized the right vertebral artery.  Diagnostic intracranial runs were obtained in multiple projections.  Following this, we catheterized the left vertebral artery using an  angled taper catheter. Diagnostic intracranial runs were performed in  anteroposterior and lateral projections. Roadmap views were taken as  necessary for catheter navigation.     Upon completion of the procedure, the catheter was withdrawn and  subsequently the sheath was removed. Hemostasis was obtained by using  6F Angioseal Closure device. Patient tolerated the procedure well and  completed without any immediate complications. Patient was then  transferred to post-operative monitoring unit.      Interpretation of images:  Series number 1-2 demonstrates a right vertebral artery injection in  Town's projection and lateral projection. The distal V2 segment of the  right vertebral artery appears normal, the V3 segment demonstrates  tortuosity in the proximal segment. The distal segment appears  normal.  The V4 segment of the right vertebral artery demonstrates a fusiform  dilatation which is apparent on the lateral projection. The lateral  projection provides the best view of the dissecting fusiform aneurysm.  In this view, the widest aneurysm measurement is 5.54 mm, the length  of the fusiform segment is 20.61 mm. The caliber of the vessel distal  to the aneurysm measures 2.49 mm and the vessel proximal to the  aneurysm measures 2.96 mm. The right sided posterior inferior  cerebellar artery is absent. There is a anterior inferior cerebellar  artery-posterior inferior cerebellar artery complex noted on the right  side. The left anterior inferior cerebellar arteries also visualized  and appears normal. The basilar trunk itself appears normal and  terminates into bilateral posterior cerebral arteries. The bilateral  superior cerebellar arteries are also normal.     Series number 4 demonstrates the best AP projection of the dissecting  fusiform aneurysm. In this view, the vessel segment distal to the  aneurysm measures 3.08 mm , the vessel segment proximal to the  aneurysm measures 2.77 mm. The curved distance measurement across the  fusiform aneurysm measures 33.79 mm.     Series #5 Left vertebral artery: Cranial view  Intracranial view of the left vertebral artery in the anteroposterior,  lateral projections demonstrates a normal left vertebral artery. The  left posterior inferior cerebellar artery originates from the distal  left vertebral artery. The basilar artery is patent and bifurcates  into bilateral posterior cerebral arteries. The bilateral superior  cerebellar arteries are normal. The capillary and venous phases are  normal.     Right common femoral artery: Pelvic view  Through the 5-Cook Islander sheath angiography was performed over the right  groin. Pelvic view of the right common femoral artery in the RAVI  projection demonstrates a normal right common femoral artery,  superficial femoral artery, and  deep femoral artery. The sheath is  located above the bifurcation. There is no significant  atherosclerosis, stenosis, dissection or pseudoaneurysm noted.     Dr. Larry Royal was present for the entire procedure.                                                                      Impression:     *  A dissecting fusiform aneurysm is noted in the V4 segment of the  right vertebral artery measuring 5.54 mm in the widest segment and  20.61 mm in length. The thrombosed part of the pseudoaneurysm is  visualized better on T2 sequence of the MRI brain.     Plan:     *  Clinic visit in 2 weeks  *  Plan for Endovascular Flow Diversion.   *  Start DAPT     Toby Mcgraw MD, MPH  Neuroendovascular Surgery Fellow  Pager: [phone]   This result has not been signed. Information might be incomplete.         Assessment and Plan:     1. 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 The thrombosed part of the pseudoaneurysm is visualized better on T2 sequence of the MRI brain )    2. TIA (transient ischemic attack) 2/13/2024    This is a very pleasant 48-year-old young male with recent history of dissecting fusiform aneurysm of right vertebral artery V4 segment underwent extensive evaluation at [facility] and subsequently seen and evaluated at [facility] and also [facility].  Currently he is asymptomatic  Taking dual antiplatelet agent and tolerating  Blood pressure is in good control with amlodipine  Recently initiated high-dose atorvastatin  He is currently trying to make decision whether to go with Flow diversion procedure/surgery in the near future and also working with neurosurgeon for the timing etc.  He denies any headache dizziness lightheadedness    I have reviewed his imaging studies    Based on his  history and previous chiropractic neck manipulations likely developed dissection at that time which was further worsened nonthrombosed recently and he becomes symptomatic.  Fortunately no neurological deficits    He has no evidence of MFS, LDS, EDS on exam  No family history of arterial or organ rupture  Recent head and neck CTA no evidence of FMD in carotid arteries  Aortic arch and branch vessels looks good  Only abnormality thrombosed right V4 aneurysm likely at the PICA origin.    Other possibility of dissection/aneurysm is underlying FMD (fibromuscular dysplasia) which is mostly seen in woman but also found in men.  Given his young age, will get CTA of chest, abdomen, pelvis with leg runoff to rule out any FMD type changes in other arterial bed specifically renal arteries, mesenteric arteries and iliac arteries etc..    Virtual or office visit few days after completion of CTA    Agree with the dual antiplatelet agent for now until the procedure or surgery and neurosurgeon will determine timing of holding the antiplatelet agents  Goal of blood pressure less than 130/80  Goal of LDL less than 70  Follow-up with ophthalmologist, neuro service as planned    He was advised avoiding any type of chiropractic neck manipulations or deep neck massage or roller coaster rides or heavy free weight lifting or sudden jerky neck movements etc.      3. Hyperlipidemia LDL goal <70 (baseline )  He was initiated atorvastatin 80 mg daily during recent hospitalization continue the same  If he develops any side effects with the amlodipine will switch atorvastatin to rosuvastatin in the future    4. Benign essential hypertension  Goal of blood pressure for now is less than 140 systolic and currently on amlodipine recently increased the dose to 10 mg daily continue the same if needed add angiotensin receptor blocker to current regimen    5. IFG (impaired fasting glucose)  Recent A1c 5.8  Follow diet and exercise  Will get A1c,  fasting glucose along with other lipid panel in the future      90 minutes spent on the date of the encounter doing chart review, history and exam, documentation, and further activities as noted above.  Prolonged services due to medical complexity and review of recent hospitalization records, outside evaluation in the [facility] Everywhere etc.    The longitudinal care of plan for the above diagnoses was addressed during this visit. Due to added complexity of care, we will continue to supprt Silas Lima and the subsequent management of this/these conditions and with ongoing continuity of care for this/these conditions.      Thank you for the consultation !  This note was dictated by utilizing dragon software    Copy of this note to primary care physician and referring physician    Nata Becerra MD,FAHA,FSVM,FNLA, FACP  Vascular Medicine  Clinical Hypertension Specialist   Clinical Lipidologist

## 2024-02-23 ENCOUNTER — HOSPITAL ENCOUNTER (INPATIENT)
Facility: CLINIC | Age: 49
Setting detail: SURGERY ADMIT
End: 2024-02-23
Payer: COMMERCIAL

## 2024-02-23 ENCOUNTER — TELEPHONE (OUTPATIENT)
Dept: NEUROSURGERY | Facility: CLINIC | Age: 49
End: 2024-02-23
Payer: COMMERCIAL

## 2024-02-23 ENCOUNTER — PATIENT OUTREACH (OUTPATIENT)
Dept: NEUROLOGY | Facility: CLINIC | Age: 49
End: 2024-02-23
Payer: COMMERCIAL

## 2024-02-23 ENCOUNTER — TELEPHONE (OUTPATIENT)
Dept: OTHER | Facility: CLINIC | Age: 49
End: 2024-02-23
Payer: COMMERCIAL

## 2024-02-23 NOTE — TELEPHONE ENCOUNTER
Called patient to schedule Procedure with     Date of Procedure: 4/11    Location of Procedure: [place] [facility]    Pre-Op H&P: PCP    Additional comments: patient is aware of above dates and times. Will reach out with any questions and await instruction from RN Anna C. Schoenecker on 2/23/2024 at 10:40 AM

## 2024-02-23 NOTE — PROGRESS NOTES
Spoke with patient.     Patient plans to do follow-up scans on 3/27/24. Held phone appointment on 4/924 at 2:30 to review and determine if patient will proceed with aneurysm treatment on 4/11/24.    Reviewed 4/11/24 procedure instructions. Confirmed date and time. Answered all patient questions. Patient instructions sent via [employer] [place].     Message sent to schedule 4/9/24 appointment.     Patient has my contact information and was encouraged to call with questions/concerns.     Karon Arguelles RN 2/23/2024 2:34 PM

## 2024-02-23 NOTE — TELEPHONE ENCOUNTER
Follow-up to 2/22/24    CTA chest/abd/pelvis with contrast  Follow up one week later - may be virtual or in-clinic.      Latest Reference Range & Units Most Recent   Creatinine 0.67 - 1.17 mg/dL 0.78  2/14/24 15:57   GFR Estimate >60 mL/min/1.73m2 >90  2/14/24 15:57   No contrast allergy per chart review.    Report given to floor RN. Patient's Aunt updated.

## 2024-02-23 NOTE — PATIENT INSTRUCTIONS
You are scheduled for a cerebral angiogram with Aneurysm Treatment , under general anesthesia, with Dr. Royal on 4/11/24. Arrival Time: 10:00 am. Procedure Time: 12:00 pm.    Please follow these instructions:    * You will need a pre-op physical within 30 days prior to your procedure. You may do this with your primary care provider or with the Pre-Operative Assessment Center (PAC), located at [facility] at [address] [place] [address]. The PAC phone number is [phone].    * Continue taking 81 mg aspirin and Plavix 75 mg daily. You will remain on these medications for your procedure.     * Check in at the Surgery Admission Unit (3C), on the third floor, at the [facility]. The address is [address] [place] [address]. The phone number is [phone].     * Nothing to eat for 8 hours prior to arrival time. You may drink clear liquids (includes water, Jell-O, clear broth, apple juice or any non-carbonated beverage that you can see through) up until 2 hours prior to arrival time.     * You may take your medications, including aspirin and Plavix, with a sip of water the morning of the procedure.     * You will stay overnight at the hospital for observation after the procedure. We aim to discharge you by 11:00 AM the following day. Please have your ride available by 10:00 AM.     PLEASE NOTE our COVID-19 visitors policy: Adult surgical and procedural patients can have two visitors throughout the surgery process. The two visitors may include children of any age; please note, children cannot stay in the waiting room alone.    All discharge instructions will be given to the  or volunteer. Documentation for the post-operative plan will be given to the patient and . Patients are required to have someone to stay with them for 24 hours after their procedure.    If you have questions regarding your procedure, please contact me at  [phone], option 1.    If you need to cancel, reschedule or have procedure scheduling related questions, please call [facility] Procedure Scheduling at [phone].    Thank you,  Karon Arguelles, RN, CNRN, SCRN  Jacqueline Burks, RN, BSN  Stroke & [facility]end[facility] Care Coordinator

## 2024-02-26 ENCOUNTER — MYC MEDICAL ADVICE (OUTPATIENT)
Dept: OTHER | Facility: CLINIC | Age: 49
End: 2024-02-26
Payer: COMMERCIAL

## 2024-02-28 ENCOUNTER — DOCUMENTATION ONLY (OUTPATIENT)
Dept: NEUROSURGERY | Facility: CLINIC | Age: 49
End: 2024-02-28
Payer: COMMERCIAL

## 2024-02-28 ENCOUNTER — HOSPITAL ENCOUNTER (OUTPATIENT)
Dept: CT IMAGING | Facility: CLINIC | Age: 49
Discharge: HOME | End: 2024-02-28
Attending: INTERNAL MEDICINE | Admitting: INTERNAL MEDICINE
Payer: COMMERCIAL

## 2024-02-28 DIAGNOSIS — G45.9 TIA (TRANSIENT ISCHEMIC ATTACK): ICD-10-CM

## 2024-02-28 DIAGNOSIS — I72.6 VERTEBRAL ARTERY ANEURYSM (H): ICD-10-CM

## 2024-02-28 PROCEDURE — 250N000009 HC RX 250: Performed by: INTERNAL MEDICINE

## 2024-02-28 PROCEDURE — 250N000011 HC RX IP 250 OP 636: Performed by: INTERNAL MEDICINE

## 2024-02-28 PROCEDURE — 71275 CT ANGIOGRAPHY CHEST: CPT

## 2024-02-28 RX ORDER — IOPAMIDOL 755 MG/ML
100 INJECTION, SOLUTION INTRAVASCULAR ONCE
Status: COMPLETED | OUTPATIENT
Start: 2024-02-28 | End: 2024-02-28

## 2024-02-28 RX ADMIN — IOPAMIDOL 100 ML: 755 INJECTION, SOLUTION INTRAVENOUS at 08:01

## 2024-02-28 RX ADMIN — SODIUM CHLORIDE 80 ML: 9 INJECTION, SOLUTION INTRAVENOUS at 08:01

## 2024-02-28 NOTE — CONFIDENTIAL NOTE
Faxed (spouse) leave of absence paperwork  to [employer] at fax# [phone] and also sent paperwork to scan to patient's chart.

## 2024-03-06 ENCOUNTER — OFFICE VISIT (OUTPATIENT)
Dept: OTHER | Facility: CLINIC | Age: 49
End: 2024-03-06
Attending: INTERNAL MEDICINE
Payer: COMMERCIAL

## 2024-03-06 ENCOUNTER — DOCUMENTATION ONLY (OUTPATIENT)
Dept: NEUROSURGERY | Facility: CLINIC | Age: 49
End: 2024-03-06

## 2024-03-06 ENCOUNTER — TELEPHONE (OUTPATIENT)
Dept: OTHER | Facility: CLINIC | Age: 49
End: 2024-03-06

## 2024-03-06 VITALS
SYSTOLIC BLOOD PRESSURE: 130 MMHG | HEIGHT: 67 IN | BODY MASS INDEX: 22.7 KG/M2 | WEIGHT: 144.6 LBS | OXYGEN SATURATION: 100 % | HEART RATE: 73 BPM | DIASTOLIC BLOOD PRESSURE: 83 MMHG

## 2024-03-06 DIAGNOSIS — I72.6 VERTEBRAL ARTERY ANEURYSM (H): Primary | ICD-10-CM

## 2024-03-06 DIAGNOSIS — G45.9 TIA (TRANSIENT ISCHEMIC ATTACK): ICD-10-CM

## 2024-03-06 DIAGNOSIS — I10 BENIGN ESSENTIAL HYPERTENSION: ICD-10-CM

## 2024-03-06 DIAGNOSIS — R73.01 IFG (IMPAIRED FASTING GLUCOSE): ICD-10-CM

## 2024-03-06 DIAGNOSIS — E78.5 HYPERLIPIDEMIA LDL GOAL <70: ICD-10-CM

## 2024-03-06 PROCEDURE — 99215 OFFICE O/P EST HI 40 MIN: CPT | Performed by: INTERNAL MEDICINE

## 2024-03-06 PROCEDURE — 99213 OFFICE O/P EST LOW 20 MIN: CPT | Performed by: INTERNAL MEDICINE

## 2024-03-06 PROCEDURE — G2211 COMPLEX E/M VISIT ADD ON: HCPCS | Performed by: INTERNAL MEDICINE

## 2024-03-06 NOTE — PROGRESS NOTES
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      HPI: Silas Lima is a 48 year old year old patient very pleasant male originally from [place]  works for [employer] on February 13, 2024 working from home on his computer around noon he suddenly felt lightheaded sensation and double vision lasted for 5 minutes and his wife noticed his gait was lagging or disconjugate during that time.  He had another episode the same day and he came to the emergency room neuroexam was nonfocal but he underwent extensive evaluation including the head and neck CTA, MRI of the head etc..  CT and MRI showed evidence of 13 mm diameter partially thrombosed aneurysm associated with intracranial right vertebral artery likely at the PICA origin  He underwent DSA on February 14, 2024 right V4 segment dissecting thrombosed aneurysm was noted.  He was initiated dual antiplatelet agent since then aspirin with Plavix tolerating without any problems.  Baseline LDL was 125 initiated atorvastatin 80 mg daily.  He gives a history of sometime in 2021 he developed neck pain he underwent chiropractic neck manipulation and after the manipulation he developed vertigo it was self resolved.  Then followed by he travel to [facility] in 2022 developed severe neck pain in the flight after he came back seen chiropractor couple times.  No other neck injury or motor vehicle accident  He has a history of hypertension taking amlodipine and this was adjusted during recent hospitalization and  home blood pressure readings are well-controlled    After the discharge from the hospital he was seen and evaluated at [facility] and then at [facility]  He was seen few weeks ago  Dr. Royal neurosurgeon at [facility] and trying to decide the timing of flow diversion procedure under general anesthesia.    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      PAST SURGICAL HISTORY:  History reviewed. No pertinent surgical history.    ALLERGIES   No Known Allergies    FAMILY HISTORY  Family History   Problem Relation Age of Onset    C.A.D. Father     C.A.D. Paternal Grandfather     Heart Disease Father     Heart Disease Paternal Grandfather     Diabetes Father     Diabetes Maternal Grandmother     Diabetes Paternal Grandfather     Hypertension Paternal Grandmother     Hypertension Paternal Grandfather     Cerebrovascular Disease Paternal Grandfather     Lipids Paternal Grandfather        VASCULAR FAMILY HISTORY  1st order relative with atherosclerotic PAD: No  1st order relative with AAA: No  Family history of Familial Hyperlipidemia No  Family History of Hypercoagulable state:No    VASCULAR RISK FACTORS  1. Diabetes:No   2. Smoking: has never smoked.  3. HTN: controlled  4.Hyperlipidemia: Yes - controlled      SOCIAL HISTORY  Social History      Socioeconomic History    Marital status:      Spouse name: Not on file    Number of children: 0    Years of education: Not on file    Highest education level: Not on file   Occupational History    Occupation:       Employer: [employer]   Tobacco Use    Smoking status: Never    Smokeless tobacco: Never   Substance and Sexual Activity    Alcohol use: Yes     Comment: 1-2 per wk    Drug use: No    Sexual activity: Yes     Partners: Female   Other Topics Concern    Not on file   Social History Narrative    Not on file     Social Determinants of Health     Financial Resource Strain: Not on file   Food Insecurity: Not on file   Transportation Needs: Not on file   Physical Activity: Not on file   Stress: Not on file   Social Connections: Not on file   Interpersonal Safety: Not on file   Housing Stability: Not on file       ROS:   General: No change in weight, sleep or appetite.  Normal energy.  No fever or chills  Eyes: Negative for vision changes or eye problems  ENT: No problems with ears, nose or throat.  No difficulty swallowing.  Resp: No coughing, wheezing or shortness of breath  CV: No chest pains or palpitations  GI: No nausea, vomiting,  heartburn, abdominal pain, diarrhea, constipation or change in bowel habits  : No urinary frequency or dysuria, bladder or kidney problems  Musculoskeletal: No significant muscle or joint pains  Neurologic: On February 14, 2024 was admitted to the hospital with sudden history of lightheaded sensation and double vision for 5 minutes and also his wife noticed Ray was lagging or disconjugate during that time and he has a recurrent episode  Psychiatric: No problems with anxiety, depression or mental health  Heme/immune/allergy: No history of bleeding or clotting problems or anemia.  No allergies or immune system problems  Endocrine: No history of thyroid disease, diabetes or other endocrine disorders  Skin: No rashes,worrisome lesions or skin problems  Vascular:   "No claudication, lifestyle limiting or otherwise; no ischemic rest pain; no non-healing ulcers. No weakness, No loss of sensation      EXAM:  /83 (BP Location: Right arm, Patient Position: Chair, Cuff Size: Adult Regular)   Pulse 73   Ht 5' 7\" (1.702 m)   Wt 144 lb 9.6 oz (65.6 kg)   SpO2 100%   BMI 22.65 kg/m    In general, the patient is a pleasant male in no apparent distress.    HEENT: NC/AT.  PERRLA.  EOMI.  Sclerae white, not injected.  Nares clear.  Pharynx without erythema or exudate.  Dentition intact.    Neck: No adenopathy.  No thyromegaly. Carotids +2/2 bilaterally without bruits.  No jugular venous distension.   Heart: RRR. Normal S1, S2 splits physiologically. No murmur, rub, click, or gallop. The PMI is in the 5th ICS in the midclavicular line. There is no heave.    Lungs: CTA.  No ronchi, wheezes, rales.  No dullness to percussion.   Abdomen: Soft, nontender, nondistended. No organomegaly. No AAA.  No bruits.   Extremities: No clubbing, cyanosis, or edema.  No wounds.       Labs:  LIPID RESULTS:  Lab Results   Component Value Date    CHOL 202 (H) 02/14/2024    CHOL 208 (H) 02/14/2024    HDL 37 (L) 02/14/2024    HDL 37 (L) 02/14/2024     (H) 02/14/2024     (H) 02/14/2024    TRIG 201 (H) 02/14/2024    TRIG 193 (H) 02/14/2024       LIVER ENZYME RESULTS:  Lab Results   Component Value Date    AST 26 02/01/2024    AST 55 01/25/2006    ALT 31 02/01/2024    ALT 57 01/25/2006       CBC RESULTS:  Lab Results   Component Value Date    WBC 7.9 02/14/2024    WBC 12.5 (H) 01/25/2006    RBC 4.71 02/14/2024    RBC 4.62 01/25/2006    HGB 14.2 02/14/2024    HGB 13.4 01/25/2006    HCT 40.9 02/14/2024    HCT 39.9 (L) 01/25/2006    MCV 87 02/14/2024    MCV 86 01/25/2006    MCH 30.1 02/14/2024    MCH 29.0 01/25/2006    MCHC 34.7 02/14/2024    MCHC 33.6 01/25/2006    RDW 12.0 02/14/2024    RDW 13.5 01/25/2006     02/14/2024     01/25/2006       BMP RESULTS:  Lab Results   Component " Value Date     02/14/2024     01/25/2006    POTASSIUM 3.9 02/14/2024    POTASSIUM 3.8 01/25/2006    CHLORIDE 107 02/14/2024    CHLORIDE 99 01/25/2006    CO2 24 02/14/2024    CO2 27 01/25/2006    ANIONGAP 10 02/14/2024    ANIONGAP 13 01/25/2006     (H) 02/14/2024     (H) 02/14/2024     (H) 01/25/2006    BUN 8.3 02/14/2024    BUN 13 01/25/2006    CR 0.78 02/14/2024    CR 0.95 01/25/2006    GFRESTIMATED >90 02/14/2024    GFRESTIMATED >80 01/25/2006    GFRESTBLACK >80 01/25/2006    JENIFER 9.0 02/14/2024    JENIFER 8.9 01/25/2006        A1C RESULTS:  Lab Results   Component Value Date    A1C 5.8 (H) 02/13/2024         Procedures:     MRI/Head CT - CT: 13 mm hyperdensity in the right premedullary cistern with mild mass effect on the medulla   - MRI: Corresponding to the abnormality on the recent head CT/CTA, there is evidence of a 13 mm diameter partially thrombosed aneurysm associated with the intracranial right vertebral artery.    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      MRA - 13 mm partially thrombosed presumed dissecting aneurysm associated with the V4 segment of the right vertebral artery.      DSA 2/14: Right V4 segment dissecting thrombosed aneurysm    Cervical Vasculature No large vessel occlusion or hemodynamically significant stenosis       Echocardiogram 65% EF, No regional wall motion  abnormalities, normal atrial sizes,  normal left ventricular wall thickness   EKG/Telemetry SR   Other Testing Not Applicable       LDL  2/14/2024: 125 mg/dL   A1C  2/13/2024: 5.8 %   Troponin 2/13/2024: <6 ng/L     Cerebral Angiography Report   [id number]  BELLO ZAMBRANO  1975 2/14/2024 2:15 PM     Brief History:  48-year-old man with history of hypertension presenting with acute  onset posterior circulation symptoms that are now completely resolved.  These symptoms include vertigo, double vision that lasted for about 5  minutes  and disconjugate gaze noted on examination by wife. Brain  imaging at this far reveals a thrombosed aneurysm of the right  vertebral artery V4 segment which measures at least 13 mm on MRI T2  sequence.     Indication for the procedure: Evaluation of intracranial aneurysm.     Attending/s: Larry Royal MD  Fellow/s: Toby Mcgraw MD, Coral Hu MD     Anesthesia: Local anesthesia and conscious sedation  Fluoro time: 10 minutes  Fluoro dose: 292 mGy  Contrast used: 40 mL of Visi-320  Sedation time: 30 minutes of 1:1 sedation monitoring by the physician  Sedatives: IV Fentanyl 100 mcg, IV Midazolam 2 mg.   Other Medications: Subcutaneous 1% lidocaine, heparin 5000 units     Procedures:  1.  Diagnostic cervicocerebral angiography and interpretation of the  images.  2.  Right common femoral arteriotomy  3.  Diagnostic angiography of the right common femoral artery.  4.  Selective catheterization and diagnostic cerebral angiography of  the right vertebral artery, left vertebral artery  5.  Percutaneous closure of the right femoral arteriotomy by using 6F  Angioseal Closure device..     Devices and/or Implants Used:  Moffit tip H1 catheter  0.035 inch Glidewire  Angle taper catheter  5 Vincentian short sheath     Consent:   The risks and benefits of a conventional diagnostic cerebral  angiography were discussed with the patient and/or patient's family at  the bedside who agreed to proceed. The risks, which were discussed  included risk of stroke, arterial dissection, groin hematoma,  arteriovenous fistula in the groin and pseudoaneurysm of the femoral  artery. If and when radial artery approach is used, the risks  discussed included radial artery occlusion, hand ischemia. Verbal and  written informed consent was obtained.     Description of Procedure:  Patient was brought to the angiography suite and placed in supine  position. Medications were administered by the radiology nursing  staff, and the vital signs were  monitored throughout procedure under  1:1 physician monitoring. The patient was prepped and draped in a  sterile fashion. A timeout was performed prior to start of the  procedure.      The right common femoral artery was palpated using standard anatomic  landmarks. 1% of lidocaine was injected locally and a small incision  was made on the skin overlying the femoral artery using a 11-blade  scalpel. A 21-gauge needle was placed into the right femoral artery,  which was then exchanged for a 4-Mongolian dilator over a microwire. The  4-Mongolian dilator was then exchanged for a 5-Mongolian sheath over a  J-wire using the modified Seldinger's technique. The sheath was  connected to a continuous flush of heparinized saline. We initially  attempted catheterization of the right vertebral artery using the  angled taper catheter. However, due to a type III arch we changed to  an H1 catheter. We then catheterized the right vertebral artery.  Diagnostic intracranial runs were obtained in multiple projections.  Following this, we catheterized the left vertebral artery using an  angled taper catheter. Diagnostic intracranial runs were performed in  anteroposterior and lateral projections. Roadmap views were taken as  necessary for catheter navigation.     Upon completion of the procedure, the catheter was withdrawn and  subsequently the sheath was removed. Hemostasis was obtained by using  6F Angioseal Closure device. Patient tolerated the procedure well and  completed without any immediate complications. Patient was then  transferred to post-operative monitoring unit.      Interpretation of images:  Series number 1-2 demonstrates a right vertebral artery injection in  Town's projection and lateral projection. The distal V2 segment of the  right vertebral artery appears normal, the V3 segment demonstrates  tortuosity in the proximal segment. The distal segment appears normal.  The V4 segment of the right vertebral artery demonstrates a  fusiform  dilatation which is apparent on the lateral projection. The lateral  projection provides the best view of the dissecting fusiform aneurysm.  In this view, the widest aneurysm measurement is 5.54 mm, the length  of the fusiform segment is 20.61 mm. The caliber of the vessel distal  to the aneurysm measures 2.49 mm and the vessel proximal to the  aneurysm measures 2.96 mm. The right sided posterior inferior  cerebellar artery is absent. There is a anterior inferior cerebellar  artery-posterior inferior cerebellar artery complex noted on the right  side. The left anterior inferior cerebellar arteries also visualized  and appears normal. The basilar trunk itself appears normal and  terminates into bilateral posterior cerebral arteries. The bilateral  superior cerebellar arteries are also normal.     Series number 4 demonstrates the best AP projection of the dissecting  fusiform aneurysm. In this view, the vessel segment distal to the  aneurysm measures 3.08 mm , the vessel segment proximal to the  aneurysm measures 2.77 mm. The curved distance measurement across the  fusiform aneurysm measures 33.79 mm.     Series #5 Left vertebral artery: Cranial view  Intracranial view of the left vertebral artery in the anteroposterior,  lateral projections demonstrates a normal left vertebral artery. The  left posterior inferior cerebellar artery originates from the distal  left vertebral artery. The basilar artery is patent and bifurcates  into bilateral posterior cerebral arteries. The bilateral superior  cerebellar arteries are normal. The capillary and venous phases are  normal.     Right common femoral artery: Pelvic view  Through the 5-Bhutanese sheath angiography was performed over the right  groin. Pelvic view of the right common femoral artery in the RAVI  projection demonstrates a normal right common femoral artery,  superficial femoral artery, and deep femoral artery. The sheath is  located above the bifurcation.  There is no significant  atherosclerosis, stenosis, dissection or pseudoaneurysm noted.     Dr. Larry Royal was present for the entire procedure.                                                                      Impression:     *  A dissecting fusiform aneurysm is noted in the V4 segment of the  right vertebral artery measuring 5.54 mm in the widest segment and  20.61 mm in length. The thrombosed part of the pseudoaneurysm is  visualized better on T2 sequence of the MRI brain.     Plan:     *  Clinic visit in 2 weeks  *  Plan for Endovascular Flow Diversion.   *  Start DAPT     Toby Mcgraw MD, MPH  Neuroendovascular Surgery Fellow  Pager: [phone]   This result has not been signed. Information might be incomplete.     EXAM: CTA CHEST ABDOMEN PELVIS WITH RUNOFF     TECHNIQUE: Helical acquisition through the bilateral toes was  performed during the arterial phase of contrast enhancement following  the administration of 100 mL Isovue intravenous contrast. 2D and 3D  reconstructions performed by the CT technologist. Dose reduction  techniques were used.     INDICATION: History of vertebral artery aneurysm resulting in  dissection and thrombus, evaluating for additional vascular disease     COMPARISON: No pertinent comparison study is available for review.      CHEST ARTERIAL FINDINGS  Ascending aorta: Patent.  Aortic Arch: Patent with a three-vessel branching arch.  Descending aorta: Patent.     ABDOMEN ARTERIAL FINDINGS  Abdominal aorta: Patent. No fusiform dilation.  Celiac artery: Patent  Superior Mesenteric Artery: Patent  Renal Arteries: Patent  Inferior Mesenteric Artery: Patent     RIGHT LOWER EXTREMITY ARTERIAL FINDINGS  Common Iliac: Patent  External Iliac: Patent  Internal Iliac: Patent  Common Femoral: Patent  SFA: Patent  Profunda: Patent  Popiteal: Patent  Anterior Tibial: Patent  Peroneal: Patent  Posterior Tibial: Patent     LEFT LOWER EXTREMITY ARTERIAL FINDINGS  Common Iliac:  Patent  External Iliac: Patent  Internal Iliac: Patent  Common Femoral: Patent  SFA: Patent  Profunda: Patent  Popiteal: Patent  Anterior Tibial: Patent  Peroneal: Patent  Posterior Tibial: Patent     NON-ARTERIAL FINDINGS  Limited evaluation of the solid intraperitoneal organs and bowel  secondary to the arterial timing of this exam. Within this limitation,  the gallbladder is partially collapsed. Scattered colonic diverticula.  Appendix is normal. Multiple subcentimeter bilateral inguinal lymph  nodes. Mild right groin fat stranding surrounding the common femoral  artery which is likely secondary to recent intervention. No right  groin pseudoaneurysm, dissection or arterial venous fistula is seen.                                                                      IMPRESSION:  1.  Unremarkable CTA chest, abdomen, pelvis and bilateral lower  extremity's.  2.  Postprocedural changes within the right groin with no  pseudoaneurysm, dissection or arteriovenous fistula.     VALENTINO JACOBS MD        Assessment and Plan:     1. 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 The thrombosed part of the pseudoaneurysm is visualized better on T2 sequence of the MRI brain )    2. TIA (transient ischemic attack) 2/13/2024    This is a very pleasant 48-year-old young male with recent history of dissecting fusiform aneurysm of right vertebral artery V4 segment underwent extensive evaluation at [facility] and subsequently seen and evaluated at [facility] and also [facility].  Currently he is asymptomatic  Taking dual antiplatelet agent and tolerating  Blood pressure is in good control with amlodipine  Recently initiated high-dose atorvastatin  He is currently trying to make decision whether to go with Flow diversion  procedure/surgery in the near future and also working with neurosurgeon for the timing etc.  He denies any headache dizziness lightheadedness    I have reviewed his imaging studies    Based on his history and previous chiropractic neck manipulations likely developed dissection at that time which was further worsened nonthrombosed recently and he becomes symptomatic.  Fortunately no neurological deficits    He has no evidence of MFS, LDS, EDS on exam  No family history of arterial or organ rupture  Recent head and neck CTA no evidence of FMD in carotid arteries  Aortic arch and branch vessels looks good  Only abnormality thrombosed right V4 aneurysm likely at the PICA origin.    Recent CTA CAP with leg run off , normal       Virtual or office visit in 2 months     Agree with the dual antiplatelet agent for now until the procedure or surgery and neurosurgeon will determine timing of holding the antiplatelet agents  Ok to take pepcid AC   Goal of blood pressure less than 130/80  Goal of LDL less than 70  Follow-up with ophthalmologist, neuro service as planned    He was advised avoiding any type of chiropractic neck manipulations or deep neck massage or roller coaster rides or heavy free weight lifting or sudden jerky neck movements etc.      3. Hyperlipidemia LDL goal <70 (baseline )  He was initiated atorvastatin 80 mg daily during recent hospitalization continue the same  If he develops any side effects with the amlodipine will switch atorvastatin to rosuvastatin in the future    4. Benign essential hypertension  Goal of blood pressure for now is less than 140 systolic and currently on amlodipine recently increased the dose to 10 mg daily continue the same if needed add angiotensin receptor blocker to current regimen    5. IFG (impaired fasting glucose)  Recent A1c 5.8  Follow diet and exercise  Will get A1c, fasting glucose along with other lipid panel in the future      40 minutes spent on the date of the  encounter doing chart review, history and exam, documentation, and further activities as noted above.      The longitudinal care of plan for the above diagnoses was addressed during this visit. Due to added complexity of care, we will continue to supprt Silas Lima and the subsequent management of this/these conditions and with ongoing continuity of care for this/these conditions.        This note was dictated by utilizing dragon software    Copy of this note to primary care physician     Nata Becerra MD,FAHA,FSVM,FNLA, FACP  Vascular Medicine  Clinical Hypertension Specialist   Clinical Lipidologist

## 2024-03-06 NOTE — PROGRESS NOTES
PAPERWORK DOCUMENTATION    How Paperwork is received:  Right Fax    Type of Paperwork: STD    Who is the paperwork for:  Patient    Received Date March 5th, 2024    Who Received the paperwork:  Denisha    Form to be Completed by:  Krista    Anticipated Completion Date: March 12th-14th, 2024    Date Completed Form Faxed to Requested Entity: Writer faxed a copy into [employer]  @ [phone] on March 12th, 2024. A copy was also scanned into patient's EMR. Patient informed.    AMY Ruffin  Neurosurgery nurse navigator.

## 2024-03-06 NOTE — PROGRESS NOTES
"Patient is here to discuss follow up    /83 (BP Location: Right arm, Patient Position: Chair, Cuff Size: Adult Regular)   Pulse 73   Ht 5' 7\" (1.702 m)   Wt 144 lb 9.6 oz (65.6 kg)   SpO2 100%   BMI 22.65 kg/m      Questions patient would like addressed today are: N/A.    Refills are needed: No    Has homecare services and agency name:  [facility] ARELL[facility]    "

## 2024-03-06 NOTE — PATIENT INSTRUCTIONS
Follow up with neuro surgery as planned       Continue baby aspirin and Plavix , if any issues of stomach upsent may take OTC Pepcid AC 20 mg , 30 minutes before breakfast or dinner     Virtual visit in 2 months

## 2024-03-13 ENCOUNTER — PRE VISIT (OUTPATIENT)
Dept: OPHTHALMOLOGY | Facility: CLINIC | Age: 49
End: 2024-03-13

## 2024-03-18 ENCOUNTER — MYC MEDICAL ADVICE (OUTPATIENT)
Dept: OTHER | Facility: CLINIC | Age: 49
End: 2024-03-18
Payer: COMMERCIAL

## 2024-03-18 ENCOUNTER — TELEPHONE (OUTPATIENT)
Dept: NEUROSURGERY | Facility: CLINIC | Age: 49
End: 2024-03-18
Payer: COMMERCIAL

## 2024-03-18 NOTE — TELEPHONE ENCOUNTER
Left detailed voicemail for patient regarding  cancelling  surgery with Dr. Royal. Advised that procedure has been cancelled.   Provided contact number to discuss.  [phone]    Nasrin Collier, on 3/18/2024 at 11:09 AM

## 2024-03-21 ENCOUNTER — TELEPHONE (OUTPATIENT)
Dept: OTHER | Facility: CLINIC | Age: 49
End: 2024-03-21
Payer: COMMERCIAL

## 2024-03-21 DIAGNOSIS — I72.6 VERTEBRAL ARTERY ANEURYSM (H): Primary | ICD-10-CM

## 2024-03-21 NOTE — TELEPHONE ENCOUNTER
[facility]    Who is the name of the provider?:  MARTIN BECERRA   What is the location you see this provider at/preferred location?: [place]  Person calling / Facility: [facility]  Phone number:  [phone] (home)  Nurse call back needed:  ?     Reason for call:    Patient had a MyChart exchange with Dr. Becerra.  He would like to schedule an appointment with him sooner than 3-4 weeks.  He would like to know if he needs blood work done prior to his appointment.  Also, he would like to discuss modifying his cholesterol medication as he is stomach ulcer sensitive.    Please review and advise what needs to be scheduled.     Pharmacy location:  [facility] [id number] [place] [address]  Outside Imaging: n/a   Can we leave a detailed message on this number?  YES     3/21/2024, 12:53 PM    MARY GRACE Thurman RN

## 2024-03-21 NOTE — TELEPHONE ENCOUNTER
Per chart review see mychart encounter on 3/18/24 and schedule virtual visit with Dr. Becerra at next available if that is what patient prefers. No labs prior    Appt note: Follow up to 3/6/24    Valarie BUCHANAN, RN    [facility]  [facility]  Office: [phone]  Fax: [phone]

## 2024-03-25 NOTE — TELEPHONE ENCOUNTER
Future Appointments   Date Time Provider Department Center   4/5/2024 10:00 AM Nata Becerra MD [facility]   5/7/2024  4:40 PM Nata Becerra MD [facility]

## 2024-04-05 ENCOUNTER — OFFICE VISIT (OUTPATIENT)
Dept: OTHER | Facility: CLINIC | Age: 49
End: 2024-04-05
Attending: INTERNAL MEDICINE
Payer: COMMERCIAL

## 2024-04-05 VITALS
SYSTOLIC BLOOD PRESSURE: 123 MMHG | WEIGHT: 142.2 LBS | HEART RATE: 81 BPM | HEIGHT: 67 IN | DIASTOLIC BLOOD PRESSURE: 80 MMHG | OXYGEN SATURATION: 100 % | BODY MASS INDEX: 22.32 KG/M2

## 2024-04-05 DIAGNOSIS — I72.6 VERTEBRAL ARTERY ANEURYSM (H): Primary | ICD-10-CM

## 2024-04-05 DIAGNOSIS — E78.5 HYPERLIPIDEMIA LDL GOAL <70: ICD-10-CM

## 2024-04-05 DIAGNOSIS — I10 BENIGN ESSENTIAL HYPERTENSION: ICD-10-CM

## 2024-04-05 DIAGNOSIS — G45.9 TIA (TRANSIENT ISCHEMIC ATTACK): ICD-10-CM

## 2024-04-05 DIAGNOSIS — R73.01 IFG (IMPAIRED FASTING GLUCOSE): ICD-10-CM

## 2024-04-05 PROCEDURE — 99213 OFFICE O/P EST LOW 20 MIN: CPT | Performed by: INTERNAL MEDICINE

## 2024-04-05 PROCEDURE — 99215 OFFICE O/P EST HI 40 MIN: CPT | Performed by: INTERNAL MEDICINE

## 2024-04-05 PROCEDURE — G2211 COMPLEX E/M VISIT ADD ON: HCPCS | Performed by: INTERNAL MEDICINE

## 2024-04-05 RX ORDER — ROSUVASTATIN CALCIUM 20 MG/1
20 TABLET, COATED ORAL DAILY
Qty: 90 TABLET | Refills: 3 | Status: SHIPPED | OUTPATIENT
Start: 2024-04-05 | End: 2024-05-12

## 2024-04-05 NOTE — PROGRESS NOTES
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    For full details please see my previous office visit notes      HPI: Silas Lima is a 48 year old year old patient very pleasant male originally from [place]  works for [employer] on February 13, 2024 working from home on his computer around noon he suddenly felt lightheaded sensation and double vision lasted for 5 minutes and his wife noticed his gait was lagging or disconjugate during that time.  He had another episode the same day and he came to the emergency room neuroexam was nonfocal but he underwent extensive evaluation including the head and neck CTA, MRI of the head etc..  CT and MRI showed evidence of 13 mm diameter partially thrombosed aneurysm associated with intracranial right vertebral artery likely at the PICA origin  He underwent DSA on February 14, 2024 right V4 segment dissecting thrombosed aneurysm was noted.  He was initiated dual antiplatelet agent since then aspirin with Plavix tolerating without any problems.  Baseline LDL was 125 initiated atorvastatin 80 mg daily.  He gives a history of sometime in 2021 he developed neck pain he underwent  chiropractic neck manipulation and after the manipulation he developed vertigo it was self resolved.  Then followed by he travel to [place] in 2022 developed severe neck pain in the flight after he came back seen chiropractor couple times.  No other neck injury or motor vehicle accident  He has a history of hypertension taking amlodipine and this was adjusted during recent hospitalization and home blood pressure readings are well-controlled    After the discharge from the hospital he was seen and evaluated at [facility] and then at [facility].  He was seen few weeks ago  Dr. 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 on March 14, 2024 at [facility]  Antiplatelet medications changed currently taking Brilinta and baby aspirin  He also developed small hematoma on the right groin/thigh access site  Currently taking amlodipine 10 mg daily and also atorvastatin 80 mg daily, feeling weak and decreased exercise tolerance    PAST MEDICAL HISTORY  Past Medical History:   Diagnosis Date    Aneurysm of right vertebral artery (H24) dissection with pseudoaneyrysm etc 2/2024     Benign essential hypertension     Hyperlipidemia LDL goal <70     TIA (transient ischemic attack)        CURRENT MEDICATIONS  Current Outpatient Medications   Medication Sig Dispense Refill    amLODIPine (NORVASC) 10 MG tablet Take 1 tablet (10 mg) by mouth daily 30 tablet 3    aspirin 81 MG EC tablet Take 1 tablet (81 mg) by mouth daily 90 tablet 3    atorvastatin (LIPITOR) 80 MG tablet Take 1 tablet (80 mg) by mouth every evening 90 tablet 3    ticagrelor (BRILINTA) 90 MG tablet Take 90 mg by mouth      clopidogrel (PLAVIX) 75 MG tablet Take 1 tablet (75 mg) by mouth daily (Patient not taking: Reported on 4/5/2024) 30 tablet 1     No current facility-administered medications for this visit.        PAST SURGICAL HISTORY:  Past Surgical History:   Procedure Laterality Date    IR CAROTID CEREBRAL ANGIOGRAM BILATERAL  2/14/2024       ALLERGIES   No Known Allergies    FAMILY HISTORY  Family History   Problem Relation Age of Onset    C.A.D. Father     C.A.D. Paternal Grandfather     Heart Disease Father     Heart Disease Paternal Grandfather     Diabetes Father     Diabetes Maternal Grandmother     Diabetes Paternal Grandfather     Hypertension Paternal Grandmother     Hypertension Paternal Grandfather     Cerebrovascular Disease Paternal Grandfather     Lipids Paternal Grandfather        VASCULAR FAMILY HISTORY  1st order relative with atherosclerotic PAD: No  1st order relative with AAA: No  Family history of Familial Hyperlipidemia No  Family History of Hypercoagulable state:No    VASCULAR RISK FACTORS  1. Diabetes:No   2. Smoking: has never smoked.  3. HTN: controlled  4.Hyperlipidemia: Yes - controlled      SOCIAL HISTORY  Social History     Socioeconomic History    Marital status:      Spouse name: Not on file    Number of children: 0    Years of education: Not on file    Highest education level: Not on file   Occupational History    Occupation:       Employer: [employer]   Tobacco Use    Smoking status: Never    Smokeless tobacco: Never   Substance and Sexual Activity    Alcohol use: Not Currently     Comment: 1-2 per wk    Drug use: No    Sexual activity: Yes     Partners: Female   Other Topics Concern    Not on file   Social History Narrative    Not on file     Social Determinants of Health     Financial Resource Strain: Not on file   Food Insecurity: Not on file   Transportation Needs: Not on file   Physical Activity: Not on file   Stress: Not on file   Social Connections: Not on file   Interpersonal Safety: Not on file   Housing Stability: Not on file       ROS:   General: No change in weight, sleep or appetite.  Normal energy.  No fever or chills  Eyes: Negative for vision changes  "or eye problems  ENT: No problems with ears, nose or throat.  No difficulty swallowing.  Resp: No coughing, wheezing or shortness of breath  CV: No chest pains or palpitations  GI: No nausea, vomiting,  heartburn, abdominal pain, diarrhea, constipation or change in bowel habits  : No urinary frequency or dysuria, bladder or kidney problems  Musculoskeletal: No significant muscle or joint pains  Neurologic: On February 14, 2024 was admitted to the hospital with sudden history of lightheaded sensation and double vision for 5 minutes and also his wife noticed Ray was lagging or disconjugate during that time and he has a recurrent episode  Psychiatric: No problems with anxiety, depression or mental health  Heme/immune/allergy: No history of bleeding or clotting problems or anemia.  No allergies or immune system problems  Endocrine: No history of thyroid disease, diabetes or other endocrine disorders  Skin: No rashes,worrisome lesions or skin problems  Vascular:  No claudication, lifestyle limiting or otherwise; no ischemic rest pain; no non-healing ulcers. No weakness, No loss of sensation      EXAM:  /80 (BP Location: Right arm, Patient Position: Chair, Cuff Size: Adult Regular)   Pulse 81   Ht 5' 7\" (1.702 m)   Wt 142 lb 3.2 oz (64.5 kg)   SpO2 100%   BMI 22.27 kg/m    In general, the patient is a pleasant male in no apparent distress.    HEENT: NC/AT.  PERRLA.  EOMI.  Sclerae white, not injected.  Nares clear.  Pharynx without erythema or exudate.  Dentition intact.    Neck: No adenopathy.  No thyromegaly. Carotids +2/2 bilaterally without bruits.  No jugular venous distension.   Heart: RRR. Normal S1, S2 splits physiologically. No murmur, rub, click, or gallop. The PMI is in the 5th ICS in the midclavicular line. There is no heave.    Lungs: CTA.  No ronchi, wheezes, rales.  No dullness to percussion.   Abdomen: Soft, nontender, nondistended. No organomegaly. No AAA.  No bruits.   Extremities: No " clubbing, cyanosis, or edema.  No wounds.       Labs:  LIPID RESULTS:  Lab Results   Component Value Date    CHOL 202 (H) 02/14/2024    CHOL 208 (H) 02/14/2024    HDL 37 (L) 02/14/2024    HDL 37 (L) 02/14/2024     (H) 02/14/2024     (H) 02/14/2024    TRIG 201 (H) 02/14/2024    TRIG 193 (H) 02/14/2024       LIVER ENZYME RESULTS:  Lab Results   Component Value Date    AST 26 02/01/2024    AST 55 01/25/2006    ALT 31 02/01/2024    ALT 57 01/25/2006       CBC RESULTS:  Lab Results   Component Value Date    WBC 7.9 02/14/2024    WBC 12.5 (H) 01/25/2006    RBC 4.71 02/14/2024    RBC 4.62 01/25/2006    HGB 14.2 02/14/2024    HGB 13.4 01/25/2006    HCT 40.9 02/14/2024    HCT 39.9 (L) 01/25/2006    MCV 87 02/14/2024    MCV 86 01/25/2006    MCH 30.1 02/14/2024    MCH 29.0 01/25/2006    MCHC 34.7 02/14/2024    MCHC 33.6 01/25/2006    RDW 12.0 02/14/2024    RDW 13.5 01/25/2006     02/14/2024     01/25/2006       BMP RESULTS:  Lab Results   Component Value Date     02/14/2024     01/25/2006    POTASSIUM 3.9 02/14/2024    POTASSIUM 3.8 01/25/2006    CHLORIDE 107 02/14/2024    CHLORIDE 99 01/25/2006    CO2 24 02/14/2024    CO2 27 01/25/2006    ANIONGAP 10 02/14/2024    ANIONGAP 13 01/25/2006     (H) 02/14/2024     (H) 02/14/2024     (H) 01/25/2006    BUN 8.3 02/14/2024    BUN 13 01/25/2006    CR 0.78 02/14/2024    CR 0.95 01/25/2006    GFRESTIMATED >90 02/14/2024    GFRESTIMATED >80 01/25/2006    GFRESTBLACK >80 01/25/2006    JENIFER 9.0 02/14/2024    JENIFER 8.9 01/25/2006        A1C RESULTS:  Lab Results   Component Value Date    A1C 5.8 (H) 02/13/2024         Procedures:     MRI/Head CT - CT: 13 mm hyperdensity in the right premedullary cistern with mild mass effect on the medulla   - MRI: Corresponding to the abnormality on the recent head CT/CTA, there is evidence of a 13 mm diameter partially thrombosed aneurysm associated with the intracranial right vertebral artery.     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      MRA - 13 mm partially thrombosed presumed dissecting aneurysm associated with the V4 segment of the right vertebral artery.      DSA 2/14: Right V4 segment dissecting thrombosed aneurysm    Cervical Vasculature No large vessel occlusion or hemodynamically significant stenosis       Echocardiogram 65% EF, No regional wall motion  abnormalities, normal atrial sizes,  normal left ventricular wall thickness   EKG/Telemetry SR   Other Testing Not Applicable       LDL  2/14/2024: 125 mg/dL   A1C  2/13/2024: 5.8 %   Troponin 2/13/2024: <6 ng/L     Cerebral Angiography Report   [id number]  BELLO ZAMBRANO  1975 2/14/2024 2:15 PM     Brief History:  48-year-old man with history of hypertension presenting with acute  onset posterior circulation symptoms that are now completely resolved.  These symptoms include vertigo, double vision that lasted for about 5  minutes and disconjugate gaze noted on examination by wife. Brain  imaging at this far reveals a thrombosed aneurysm of the right  vertebral artery V4 segment which measures at least 13 mm on MRI T2  sequence.     Indication for the procedure: Evaluation of intracranial aneurysm.     Attending/s: Larry Royal MD  Fellow/s: Toby cMgraw MD, Coral Hu MD     Anesthesia: Local anesthesia and conscious sedation  Fluoro time: 10 minutes  Fluoro dose: 292 mGy  Contrast used: 40 mL of Visi-320  Sedation time: 30 minutes of 1:1 sedation monitoring by the physician  Sedatives: IV Fentanyl 100 mcg, IV Midazolam 2 mg.   Other Medications: Subcutaneous 1% lidocaine, heparin 5000 units     Procedures:  1.  Diagnostic cervicocerebral angiography and interpretation of the  images.  2.  Right common femoral arteriotomy  3.  Diagnostic angiography of the right common femoral artery.  4.  Selective catheterization and diagnostic cerebral angiography of  the right  vertebral artery, left vertebral artery  5.  Percutaneous closure of the right femoral arteriotomy by using 6F  Angioseal Closure device..     Devices and/or Implants Used:  [place] tip H1 catheter  0.035 inch Glidewire  Angle taper catheter  5 Armenian short sheath     Consent:   The risks and benefits of a conventional diagnostic cerebral  angiography were discussed with the patient and/or patient's family at  the bedside who agreed to proceed. The risks, which were discussed  included risk of stroke, arterial dissection, groin hematoma,  arteriovenous fistula in the groin and pseudoaneurysm of the femoral  artery. If and when radial artery approach is used, the risks  discussed included radial artery occlusion, hand ischemia. Verbal and  written informed consent was obtained.     Description of Procedure:  Patient was brought to the angiography suite and placed in supine  position. Medications were administered by the radiology nursing  staff, and the vital signs were monitored throughout procedure under  1:1 physician monitoring. The patient was prepped and draped in a  sterile fashion. A timeout was performed prior to start of the  procedure.      The right common femoral artery was palpated using standard anatomic  landmarks. 1% of lidocaine was injected locally and a small incision  was made on the skin overlying the femoral artery using a 11-blade  scalpel. A 21-gauge needle was placed into the right femoral artery,  which was then exchanged for a 4-Armenian dilator over a microwire. The  4-Armenian dilator was then exchanged for a 5-Armenian sheath over a  J-wire using the modified Seldinger's technique. The sheath was  connected to a continuous flush of heparinized saline. We initially  attempted catheterization of the right vertebral artery using the  angled taper catheter. However, due to a type III arch we changed to  an H1 catheter. We then catheterized the right vertebral artery.  Diagnostic intracranial runs  were obtained in multiple projections.  Following this, we catheterized the left vertebral artery using an  angled taper catheter. Diagnostic intracranial runs were performed in  anteroposterior and lateral projections. Roadmap views were taken as  necessary for catheter navigation.     Upon completion of the procedure, the catheter was withdrawn and  subsequently the sheath was removed. Hemostasis was obtained by using  6F Angioseal Closure device. Patient tolerated the procedure well and  completed without any immediate complications. Patient was then  transferred to post-operative monitoring unit.      Interpretation of images:  Series number 1-2 demonstrates a right vertebral artery injection in  Town's projection and lateral projection. The distal V2 segment of the  right vertebral artery appears normal, the V3 segment demonstrates  tortuosity in the proximal segment. The distal segment appears normal.  The V4 segment of the right vertebral artery demonstrates a fusiform  dilatation which is apparent on the lateral projection. The lateral  projection provides the best view of the dissecting fusiform aneurysm.  In this view, the widest aneurysm measurement is 5.54 mm, the length  of the fusiform segment is 20.61 mm. The caliber of the vessel distal  to the aneurysm measures 2.49 mm and the vessel proximal to the  aneurysm measures 2.96 mm. The right sided posterior inferior  cerebellar artery is absent. There is a anterior inferior cerebellar  artery-posterior inferior cerebellar artery complex noted on the right  side. The left anterior inferior cerebellar arteries also visualized  and appears normal. The basilar trunk itself appears normal and  terminates into bilateral posterior cerebral arteries. The bilateral  superior cerebellar arteries are also normal.     Series number 4 demonstrates the best AP projection of the dissecting  fusiform aneurysm. In this view, the vessel segment distal to the  aneurysm  measures 3.08 mm , the vessel segment proximal to the  aneurysm measures 2.77 mm. The curved distance measurement across the  fusiform aneurysm measures 33.79 mm.     Series #5 Left vertebral artery: Cranial view  Intracranial view of the left vertebral artery in the anteroposterior,  lateral projections demonstrates a normal left vertebral artery. The  left posterior inferior cerebellar artery originates from the distal  left vertebral artery. The basilar artery is patent and bifurcates  into bilateral posterior cerebral arteries. The bilateral superior  cerebellar arteries are normal. The capillary and venous phases are  normal.     Right common femoral artery: Pelvic view  Through the 5-[place] sheath angiography was performed over the right  groin. Pelvic view of the right common femoral artery in the RAVI  projection demonstrates a normal right common femoral artery,  superficial femoral artery, and deep femoral artery. The sheath is  located above the bifurcation. There is no significant  atherosclerosis, stenosis, dissection or pseudoaneurysm noted.     Dr. Larry Royal was present for the entire procedure.                                                                      Impression:     *  A dissecting fusiform aneurysm is noted in the V4 segment of the  right vertebral artery measuring 5.54 mm in the widest segment and  20.61 mm in length. The thrombosed part of the pseudoaneurysm is  visualized better on T2 sequence of the MRI brain.     Plan:     *  Clinic visit in 2 weeks  *  Plan for Endovascular Flow Diversion.   *  Start DAPT     Toby Mcgraw MD, MPH  Neuroendovascular Surgery Fellow  Pager: [phone]   This result has not been signed. Information might be incomplete.     EXAM: CTA CHEST ABDOMEN PELVIS WITH RUNOFF     TECHNIQUE: Helical acquisition through the bilateral toes was  performed during the arterial phase of contrast enhancement following  the administration of 100 mL Isovue  intravenous contrast. 2D and 3D  reconstructions performed by the CT technologist. Dose reduction  techniques were used.     INDICATION: History of vertebral artery aneurysm resulting in  dissection and thrombus, evaluating for additional vascular disease     COMPARISON: No pertinent comparison study is available for review.      CHEST ARTERIAL FINDINGS  Ascending aorta: Patent.  Aortic Arch: Patent with a three-vessel branching arch.  Descending aorta: Patent.     ABDOMEN ARTERIAL FINDINGS  Abdominal aorta: Patent. No fusiform dilation.  Celiac artery: Patent  Superior Mesenteric Artery: Patent  Renal Arteries: Patent  Inferior Mesenteric Artery: Patent     RIGHT LOWER EXTREMITY ARTERIAL FINDINGS  Common Iliac: Patent  External Iliac: Patent  Internal Iliac: Patent  Common Femoral: Patent  SFA: Patent  Profunda: Patent  Popiteal: Patent  Anterior Tibial: Patent  Peroneal: Patent  Posterior Tibial: Patent     LEFT LOWER EXTREMITY ARTERIAL FINDINGS  Common Iliac: Patent  External Iliac: Patent  Internal Iliac: Patent  Common Femoral: Patent  SFA: Patent  Profunda: Patent  Popiteal: Patent  Anterior Tibial: Patent  Peroneal: Patent  Posterior Tibial: Patent     NON-ARTERIAL FINDINGS  Limited evaluation of the solid intraperitoneal organs and bowel  secondary to the arterial timing of this exam. Within this limitation,  the gallbladder is partially collapsed. Scattered colonic diverticula.  Appendix is normal. Multiple subcentimeter bilateral inguinal lymph  nodes. Mild right groin fat stranding surrounding the common femoral  artery which is likely secondary to recent intervention. No right  groin pseudoaneurysm, dissection or arterial venous fistula is seen.                                                                      IMPRESSION:  1.  Unremarkable CTA chest, abdomen, pelvis and bilateral lower  extremity's.  2.  Postprocedural changes within the right groin with no  pseudoaneurysm, dissection or arteriovenous  fistula.     VALENTINO JACOBS MD        Assessment and Plan:     1. 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 The thrombosed part of the pseudoaneurysm is visualized better on T2 sequence of the MRI brain )    Successful endovascular treatment of a giant partially-thrombosed symptomatic right post-PICA vertebral artery aneurysm with the Pipeline device. At [facility] 3/14/2024 by   Antiplatelet medications changed by neurosurgery service currently taking Brilinta and baby aspirin daily, they are planning to continue Brilinta for 9 months then tapering down  He is a lifetime candidate for aspirin    2.  Hx of TIA (transient ischemic attack) 2/13/2024    For full details please see my previous notes    I have reviewed his recent imaging studies, neurosurgery evaluation done at [facility]    Based on his history and previous chiropractic neck manipulations likely developed dissection at that time which was further worsened nonthrombosed recently and he becomes symptomatic.  Fortunately no neurological deficits    He has no evidence of MFS, LDS, EDS on exam  No family history of arterial or organ rupture  Recent head and neck CTA no evidence of FMD in carotid arteries  Aortic arch and branch vessels looks good    Recent CTA CAP with leg run off , normal     Ok to take pepcid AC   Goal of blood pressure less than 130/80  Goal of LDL less than 70    He was advised avoiding any type of chiropractic neck manipulations or deep neck massage or roller coaster rides or heavy free weight lifting or sudden jerky neck movements etc.    Suggested follow neurosurgery recommendations and surveillance imaging studies  Continue antiplatelet medications per neurosurgery service      3. Hyperlipidemia LDL goal <70 (baseline  )  He was initiated atorvastatin 80 mg daily during recent hospitalization   He also takes amlodipine 10 mg daily this can cause drug-drug interaction and is having decreased exercise tolerance and fatigue tiredness  I will discontinue atorvastatin instead prescribe rosuvastatin 20 mg daily  Repeat lipid panel, CMP, and A1c in 6 weeks then virtual visit    4. Benign essential hypertension  Excellent blood pressure control with amlodipine continue the same    5. IFG (impaired fasting glucose)  Recent A1c 5.8  Follow diet and exercise  Will get A1c, fasting glucose along with other lipid panel       40 minutes spent on the date of the encounter doing chart review, history and exam, documentation, and further activities as noted above.      The longitudinal care of plan for the above diagnoses was addressed during this visit. Due to added complexity of care, we will continue to supprt Silas Lima and the subsequent management of this/these conditions and with ongoing continuity of care for this/these conditions.        This note was dictated by utilizing dragon software    Copy of this note to primary care physician     Nata Becerra MD,FAHA,FSVM,FNLA, FACP  Vascular Medicine  Clinical Hypertension Specialist   Clinical Lipidologist

## 2024-04-05 NOTE — PROGRESS NOTES
"Patient is here to discuss follow up    /80 (BP Location: Right arm, Patient Position: Chair, Cuff Size: Adult Regular)   Pulse 81   Ht 5' 7\" (1.702 m)   Wt 142 lb 3.2 oz (64.5 kg)   SpO2 100%   BMI 22.27 kg/m      Questions patient would like addressed today are: N/A.    Refills are needed: No    Has homecare services and agency name:  [facility]    "

## 2024-04-05 NOTE — PATIENT INSTRUCTIONS
Stop Atorvastatin and instead take Rosuvastatin 20 mg daily     Go for  Fasting labs , CBC, CMP,  A1c,  Lipid panel in 6 weeks then Virtual visit     Follow Neurosurgery service recommendations for Antiplatelet medications and follow up imaging studies

## 2024-04-08 ENCOUNTER — MYC REFILL (OUTPATIENT)
Dept: FAMILY MEDICINE | Facility: CLINIC | Age: 49
End: 2024-04-08
Payer: COMMERCIAL

## 2024-04-10 ENCOUNTER — TELEPHONE (OUTPATIENT)
Dept: OTHER | Facility: CLINIC | Age: 49
End: 2024-04-10
Payer: COMMERCIAL

## 2024-04-10 DIAGNOSIS — I72.6 VERTEBRAL ARTERY ANEURYSM (H): Primary | ICD-10-CM

## 2024-04-10 DIAGNOSIS — E78.5 HYPERLIPIDEMIA LDL GOAL <70: ICD-10-CM

## 2024-04-10 DIAGNOSIS — I10 BENIGN ESSENTIAL HYPERTENSION: ICD-10-CM

## 2024-04-10 DIAGNOSIS — R73.01 IFG (IMPAIRED FASTING GLUCOSE): ICD-10-CM

## 2024-04-10 NOTE — TELEPHONE ENCOUNTER
Routing to scheduling to coordinate the following:    Fasting labs  Virtual follow up a week after labs  Please schedule this in 6 weeks     Appt note: 6 week follow up to 4/5/24. Fasting labs prior.    Valarie BUCHANAN, RN    [facility]– [place][note 2]  Office: [phone]  Fax: [phone]

## 2024-04-14 ENCOUNTER — HEALTH MAINTENANCE LETTER (OUTPATIENT)
Age: 49
End: 2024-04-14

## 2024-04-15 NOTE — TELEPHONE ENCOUNTER
Patient is scheduled for virtual visit on 5/7/24. Left message to schedule fasting lab prior to visit.

## 2024-05-12 ENCOUNTER — MYC REFILL (OUTPATIENT)
Dept: FAMILY MEDICINE | Facility: CLINIC | Age: 49
End: 2024-05-12
Payer: COMMERCIAL

## 2024-05-12 ENCOUNTER — MYC REFILL (OUTPATIENT)
Dept: OTHER | Facility: CLINIC | Age: 49
End: 2024-05-12
Payer: COMMERCIAL

## 2024-05-12 ENCOUNTER — MYC MEDICAL ADVICE (OUTPATIENT)
Dept: OTHER | Facility: CLINIC | Age: 49
End: 2024-05-12
Payer: COMMERCIAL

## 2024-05-12 DIAGNOSIS — H53.2 DIPLOPIA: ICD-10-CM

## 2024-05-12 DIAGNOSIS — E78.5 HYPERLIPIDEMIA LDL GOAL <70: ICD-10-CM

## 2024-05-13 RX ORDER — ROSUVASTATIN CALCIUM 20 MG/1
20 TABLET, COATED ORAL DAILY
Qty: 90 TABLET | Refills: 3 | Status: SHIPPED | OUTPATIENT
Start: 2024-05-13 | End: 2024-05-14

## 2024-05-13 NOTE — TELEPHONE ENCOUNTER
Prescription approved per [facility] Refill Protocol.  Cindy Wallace, MEGANN, RN, CV-[facility]

## 2024-05-14 RX ORDER — AMLODIPINE BESYLATE 10 MG/1
10 TABLET ORAL DAILY
Qty: 90 TABLET | Refills: 3 | Status: SHIPPED | OUTPATIENT
Start: 2024-05-14 | End: 2024-06-10

## 2024-05-14 RX ORDER — ROSUVASTATIN CALCIUM 20 MG/1
20 TABLET, COATED ORAL DAILY
Qty: 90 TABLET | Refills: 3 | Status: SHIPPED | OUTPATIENT
Start: 2024-05-14

## 2024-05-14 NOTE — TELEPHONE ENCOUNTER
"Both amlodipine and aspirin were previously ordered by:    Ordering Department:  NEUROSCIENCE  Authorized By: Emerita Bunch MD    Last office visit with Dr. Becerra 4/5/24    \"He is a lifetime candidate for aspirin\"  \"Excellent blood pressure control with amlodipine continue the same\"      Prescriptions loaded for review/consideration.  "

## 2024-05-20 ENCOUNTER — LAB (OUTPATIENT)
Dept: LAB | Facility: CLINIC | Age: 49
End: 2024-05-20
Payer: COMMERCIAL

## 2024-05-20 DIAGNOSIS — R73.01 IFG (IMPAIRED FASTING GLUCOSE): ICD-10-CM

## 2024-05-20 DIAGNOSIS — I10 BENIGN ESSENTIAL HYPERTENSION: ICD-10-CM

## 2024-05-20 DIAGNOSIS — E78.5 HYPERLIPIDEMIA LDL GOAL <70: ICD-10-CM

## 2024-05-20 LAB
ALBUMIN SERPL BCG-MCNC: 4.3 G/DL (ref 3.5–5.2)
ALP SERPL-CCNC: 87 U/L (ref 40–150)
ALT SERPL W P-5'-P-CCNC: 60 U/L (ref 0–70)
ANION GAP SERPL CALCULATED.3IONS-SCNC: 9 MMOL/L (ref 7–15)
AST SERPL W P-5'-P-CCNC: 35 U/L (ref 0–45)
BASOPHILS # BLD AUTO: 0 10E3/UL (ref 0–0.2)
BASOPHILS NFR BLD AUTO: 0 %
BILIRUB SERPL-MCNC: 0.7 MG/DL
BUN SERPL-MCNC: 9.1 MG/DL (ref 6–20)
CALCIUM SERPL-MCNC: 9.4 MG/DL (ref 8.6–10)
CHLORIDE SERPL-SCNC: 107 MMOL/L (ref 98–107)
CHOLEST SERPL-MCNC: 98 MG/DL
CREAT SERPL-MCNC: 0.88 MG/DL (ref 0.67–1.17)
DEPRECATED HCO3 PLAS-SCNC: 24 MMOL/L (ref 22–29)
EGFRCR SERPLBLD CKD-EPI 2021: >90 ML/MIN/1.73M2
EOSINOPHIL # BLD AUTO: 0.3 10E3/UL (ref 0–0.7)
EOSINOPHIL NFR BLD AUTO: 5 %
ERYTHROCYTE [DISTWIDTH] IN BLOOD BY AUTOMATED COUNT: 12 % (ref 10–15)
FASTING STATUS PATIENT QL REPORTED: YES
FASTING STATUS PATIENT QL REPORTED: YES
GLUCOSE SERPL-MCNC: 102 MG/DL (ref 70–99)
HBA1C MFR BLD: 5.8 % (ref 0–5.6)
HCT VFR BLD AUTO: 40.4 % (ref 40–53)
HDLC SERPL-MCNC: 43 MG/DL
HGB BLD-MCNC: 13.7 G/DL (ref 13.3–17.7)
IMM GRANULOCYTES # BLD: 0 10E3/UL
IMM GRANULOCYTES NFR BLD: 0 %
LDLC SERPL CALC-MCNC: 40 MG/DL
LYMPHOCYTES # BLD AUTO: 2.6 10E3/UL (ref 0.8–5.3)
LYMPHOCYTES NFR BLD AUTO: 38 %
MCH RBC QN AUTO: 29.6 PG (ref 26.5–33)
MCHC RBC AUTO-ENTMCNC: 33.9 G/DL (ref 31.5–36.5)
MCV RBC AUTO: 87 FL (ref 78–100)
MONOCYTES # BLD AUTO: 0.5 10E3/UL (ref 0–1.3)
MONOCYTES NFR BLD AUTO: 8 %
NEUTROPHILS # BLD AUTO: 3.3 10E3/UL (ref 1.6–8.3)
NEUTROPHILS NFR BLD AUTO: 49 %
NONHDLC SERPL-MCNC: 55 MG/DL
PLATELET # BLD AUTO: 266 10E3/UL (ref 150–450)
POTASSIUM SERPL-SCNC: 4.3 MMOL/L (ref 3.4–5.3)
PROT SERPL-MCNC: 6.7 G/DL (ref 6.4–8.3)
RBC # BLD AUTO: 4.63 10E6/UL (ref 4.4–5.9)
SODIUM SERPL-SCNC: 140 MMOL/L (ref 135–145)
TRIGL SERPL-MCNC: 75 MG/DL
WBC # BLD AUTO: 6.8 10E3/UL (ref 4–11)

## 2024-05-20 PROCEDURE — 80053 COMPREHEN METABOLIC PANEL: CPT

## 2024-05-20 PROCEDURE — 36415 COLL VENOUS BLD VENIPUNCTURE: CPT

## 2024-05-20 PROCEDURE — 80061 LIPID PANEL: CPT

## 2024-05-20 PROCEDURE — 83036 HEMOGLOBIN GLYCOSYLATED A1C: CPT

## 2024-05-20 PROCEDURE — 85025 COMPLETE CBC W/AUTO DIFF WBC: CPT

## 2024-05-31 ENCOUNTER — VIRTUAL VISIT (OUTPATIENT)
Dept: OTHER | Facility: CLINIC | Age: 49
End: 2024-05-31
Attending: INTERNAL MEDICINE
Payer: COMMERCIAL

## 2024-05-31 DIAGNOSIS — I10 BENIGN ESSENTIAL HYPERTENSION: ICD-10-CM

## 2024-05-31 DIAGNOSIS — I72.6 VERTEBRAL ARTERY ANEURYSM (H): Primary | ICD-10-CM

## 2024-05-31 DIAGNOSIS — R73.01 IFG (IMPAIRED FASTING GLUCOSE): ICD-10-CM

## 2024-05-31 DIAGNOSIS — E78.5 HYPERLIPIDEMIA LDL GOAL <70: ICD-10-CM

## 2024-05-31 PROCEDURE — 99443 PR PHYSICIAN TELEPHONE EVALUATION 21-30 MIN: CPT | Mod: 93 | Performed by: INTERNAL MEDICINE

## 2024-05-31 RX ORDER — OLMESARTAN MEDOXOMIL 20 MG/1
20 TABLET ORAL EVERY EVENING
Qty: 30 TABLET | Refills: 5 | Status: SHIPPED | OUTPATIENT
Start: 2024-05-31 | End: 2024-09-04

## 2024-05-31 NOTE — PATIENT INSTRUCTIONS
As we discussed on the phone today,   Excellent lipids, electrolyte panel, A1c 5.8 unchanged    Reduce amlodipine dose to 5 mg daily and take in the morning, if the blood pressure persistently elevated greater than 130 on multiple occasions may start taking Benicar 20 mg in the evening    Continue rest of the medications same    Repeat fasting lipids, CBC, A1c, CMP labs in 3 to 4 months then followed by a virtual visit.  Lab orders placed

## 2024-05-31 NOTE — PROGRESS NOTES
Silas is a 48 year old who is being evaluated via a billable telephone visit.    What phone number would you like to be contacted at? [phone]  How would you like to obtain your AVS? [place]  Originating Location (pt. Location): Home    Distant Location (provider location):  On-site    Subjective   Silas is a 48 year old, presenting for the following health issues:  Telephone ([phone] /6 week follow up to 4/5/24. Fasting labs prior. Pt pref in person /)      Objective         Vitals:  No vitals were obtained today due to virtual visit.    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     For full details please see my previous office visit notes        HPI: Silas Lima is a 48 year old year old patient very pleasant male originally from [place]  works for [employer] on February 13, 2024 working from home on his computer around noon he suddenly felt lightheaded sensation and double vision lasted for 5 minutes and his wife noticed his gait was lagging or disconjugate during that time.  He had another episode the same day and he came to the emergency room neuroexam was  nonfocal but he underwent extensive evaluation including the head and neck CTA, MRI of the head etc..  CT and MRI showed evidence of 13 mm diameter partially thrombosed aneurysm associated with intracranial right vertebral artery likely at the PICA origin  He underwent DSA on February 14, 2024 right V4 segment dissecting thrombosed aneurysm was noted.  He was initiated dual antiplatelet agent since then aspirin with Plavix tolerating without any problems.  Baseline LDL was 125 initiated atorvastatin 80 mg daily.  He gives a history of sometime in 2021 he developed neck pain he underwent chiropractic neck manipulation and after the manipulation he developed vertigo it was self resolved.  Then followed by he travel to [place] in 2022 developed severe neck pain in the flight after he came back seen chiropractor couple times.  No other neck injury or motor vehicle accident  He has a history of hypertension taking amlodipine and this was adjusted during recent hospitalization and home blood pressure readings are well-controlled     After the discharge from the hospital he was seen and evaluated at [facility] and then at [facility].  He was seen few weeks ago  Dr. 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 The thrombosed part of the pseudoaneurysm is visualized better on T2 sequence of the MRI brain )    Successful endovascular treatment of a giant partially-thrombosed symptomatic right post-PICA vertebral artery aneurysm with the Pipeline device. At [facility] 3/14/2024 by   Antiplatelet medications changed by neurosurgery service currently taking Brilinta 5 mg twice a day and baby aspirin daily, they are planning to continue Brilinta for 9 months  He is a lifetime candidate for aspirin     2.  Hx of TIA (transient ischemic attack) 2/13/2024     For full details please see my previous notes     I have reviewed his recent imaging studies, neurosurgery evaluation done at [facility]     Based on his history and previous chiropractic neck manipulations likely developed dissection at that time which was further worsened nonthrombosed recently and he becomes symptomatic.  Fortunately no neurological deficits     He has no evidence of MFS, LDS, EDS on exam  No family history of arterial or organ rupture  Recent head and neck CTA no evidence of FMD in carotid arteries  Aortic arch and branch vessels looks good     Recent CTA CAP with leg run off , normal      Ok to take pepcid AC   Goal of blood pressure less than 130/80  Goal of LDL less than 70     He was advised avoiding any type of chiropractic neck manipulations or deep neck massage or roller coaster rides or heavy free weight lifting or sudden jerky neck movements etc.     Suggested follow neurosurgery recommendations and surveillance imaging studies  Continue antiplatelet medications per neurosurgery service        3. Hyperlipidemia LDL goal <70 (baseline )  Currently taking Crestor 20 mg daily excellent lipids LDL is 40 continue the same will repeat the lipid panel in 3 months then decide  reducing the dose to 10 mg daily.  Follow therapeutic lifestyle modifications     4. Benign essential hypertension  5. Leg swelling from Amlodipine 10 mg :    Blood pressure is well-controlled but the amlodipine causing leg swelling  I will reduce amlodipine dose to 5 mg daily and take it in the morning  If the blood pressure persistently greater than 130 systolic, initiate Benicar 20 mg daily at nighttime new prescription sent  If the leg swelling does not improve after reducing the amlodipine will eventually stop the amlodipine and adjust the dose of Benicar based on the blood pressure response       5. IFG (impaired fasting glucose)  Recent A1c 5.8  Follow diet and exercise  Will get A1c, fasting glucose along with other lipid panel     Phone visit start time: 4:40 PM:  Phone visit end time: 5 :04 PM  Location of the patient: At his home  Location the provider: [facility]    More than 21 minutes spent on the date of the encounter doing chart review, review of outside records in the epic Care Everywhere, history, recent labs, documentation and addressed above-mentioned issues  Medications refilled and lab orders placed    This visit is being conducted as a virtual visit due to the emphasis on mitigation of the COVID-19 virus pandemic. The clinician has decided that the risk of an in-office visit outweighs the benefit for this patient.         Nata Becerra MD,FAHA,FSVM,FNLA, FACP  Vascular Medicine  Clinical Hypertension Specialist   Clinical Lipidologist

## 2024-06-08 ENCOUNTER — MYC MEDICAL ADVICE (OUTPATIENT)
Dept: OTHER | Facility: CLINIC | Age: 49
End: 2024-06-08
Payer: COMMERCIAL

## 2024-06-08 DIAGNOSIS — H53.2 DIPLOPIA: ICD-10-CM

## 2024-06-10 RX ORDER — AMLODIPINE BESYLATE 5 MG/1
5 TABLET ORAL DAILY
Qty: 90 TABLET | Refills: 3 | Status: SHIPPED | OUTPATIENT
Start: 2024-06-10

## 2024-06-10 NOTE — TELEPHONE ENCOUNTER
Per 5/31/24 virtual visit:  I will reduce amlodipine dose to 5 mg daily and take it in the morning     Rx for amlodipine 5 mg loaded for review/signature.

## 2024-07-22 ENCOUNTER — TELEPHONE (OUTPATIENT)
Dept: OTHER | Facility: CLINIC | Age: 49
End: 2024-07-22
Payer: COMMERCIAL

## 2024-07-22 NOTE — TELEPHONE ENCOUNTER
Maggy, Care Coordinator with [facility], called to say she has been assigned to be the care coordinator for this patient through his insurance plan.  Maggy is calling to see if Dr. Becerra has concerns about gaps in patient's care of if there are any barriers to the care plan.  She would like to know how she can support Dr. Becerra's care plan.  This is Maggy's direct line so able to leave a message if you get her voicemail. [phone]

## 2024-07-23 NOTE — TELEPHONE ENCOUNTER
Returned call to Ishmael GUZMANN, RN    [facility]  Office: [phone]  Fax: [phone]

## 2024-07-30 NOTE — TELEPHONE ENCOUNTER
Returned call to Ishmael GUZMANN, RN    [facility]  Office: [phone]  Fax: [phone]

## 2024-07-30 NOTE — TELEPHONE ENCOUNTER
Maggy returned call reiterated the below message from 07/22/24 also added she would like to discuss treatment plan and also provided her contact information again. Maggy [phone]

## 2024-08-28 ENCOUNTER — LAB (OUTPATIENT)
Dept: LAB | Facility: CLINIC | Age: 49
End: 2024-08-28
Payer: COMMERCIAL

## 2024-08-28 DIAGNOSIS — R73.01 IFG (IMPAIRED FASTING GLUCOSE): ICD-10-CM

## 2024-08-28 DIAGNOSIS — E78.5 HYPERLIPIDEMIA LDL GOAL <70: ICD-10-CM

## 2024-08-28 DIAGNOSIS — I10 BENIGN ESSENTIAL HYPERTENSION: ICD-10-CM

## 2024-08-28 LAB
ALBUMIN SERPL BCG-MCNC: 4.3 G/DL (ref 3.5–5.2)
ALP SERPL-CCNC: 81 U/L (ref 40–150)
ALT SERPL W P-5'-P-CCNC: 46 U/L (ref 0–70)
ANION GAP SERPL CALCULATED.3IONS-SCNC: 7 MMOL/L (ref 7–15)
AST SERPL W P-5'-P-CCNC: 33 U/L (ref 0–45)
BASOPHILS # BLD AUTO: 0 10E3/UL (ref 0–0.2)
BASOPHILS NFR BLD AUTO: 1 %
BILIRUB SERPL-MCNC: 0.8 MG/DL
BUN SERPL-MCNC: 12.1 MG/DL (ref 6–20)
CALCIUM SERPL-MCNC: 9.2 MG/DL (ref 8.8–10.4)
CHLORIDE SERPL-SCNC: 107 MMOL/L (ref 98–107)
CHOLEST SERPL-MCNC: 100 MG/DL
CREAT SERPL-MCNC: 0.99 MG/DL (ref 0.67–1.17)
EGFRCR SERPLBLD CKD-EPI 2021: >90 ML/MIN/1.73M2
EOSINOPHIL # BLD AUTO: 0.3 10E3/UL (ref 0–0.7)
EOSINOPHIL NFR BLD AUTO: 5 %
ERYTHROCYTE [DISTWIDTH] IN BLOOD BY AUTOMATED COUNT: 12 % (ref 10–15)
FASTING STATUS PATIENT QL REPORTED: YES
FASTING STATUS PATIENT QL REPORTED: YES
GLUCOSE SERPL-MCNC: 99 MG/DL (ref 70–99)
HBA1C MFR BLD: 5.9 % (ref 0–5.6)
HCO3 SERPL-SCNC: 25 MMOL/L (ref 22–29)
HCT VFR BLD AUTO: 41.8 % (ref 40–53)
HDLC SERPL-MCNC: 40 MG/DL
HGB BLD-MCNC: 14.1 G/DL (ref 13.3–17.7)
IMM GRANULOCYTES # BLD: 0 10E3/UL
IMM GRANULOCYTES NFR BLD: 0 %
LDLC SERPL CALC-MCNC: 48 MG/DL
LYMPHOCYTES # BLD AUTO: 2.7 10E3/UL (ref 0.8–5.3)
LYMPHOCYTES NFR BLD AUTO: 41 %
MCH RBC QN AUTO: 29.3 PG (ref 26.5–33)
MCHC RBC AUTO-ENTMCNC: 33.7 G/DL (ref 31.5–36.5)
MCV RBC AUTO: 87 FL (ref 78–100)
MONOCYTES # BLD AUTO: 0.6 10E3/UL (ref 0–1.3)
MONOCYTES NFR BLD AUTO: 9 %
NEUTROPHILS # BLD AUTO: 3 10E3/UL (ref 1.6–8.3)
NEUTROPHILS NFR BLD AUTO: 45 %
NONHDLC SERPL-MCNC: 60 MG/DL
PLATELET # BLD AUTO: 266 10E3/UL (ref 150–450)
POTASSIUM SERPL-SCNC: 4.5 MMOL/L (ref 3.4–5.3)
PROT SERPL-MCNC: 6.7 G/DL (ref 6.4–8.3)
RBC # BLD AUTO: 4.82 10E6/UL (ref 4.4–5.9)
SODIUM SERPL-SCNC: 139 MMOL/L (ref 135–145)
TRIGL SERPL-MCNC: 61 MG/DL
WBC # BLD AUTO: 6.5 10E3/UL (ref 4–11)

## 2024-08-28 PROCEDURE — 80053 COMPREHEN METABOLIC PANEL: CPT

## 2024-08-28 PROCEDURE — 80061 LIPID PANEL: CPT

## 2024-08-28 PROCEDURE — 36415 COLL VENOUS BLD VENIPUNCTURE: CPT

## 2024-08-28 PROCEDURE — 85025 COMPLETE CBC W/AUTO DIFF WBC: CPT

## 2024-08-28 PROCEDURE — 83036 HEMOGLOBIN GLYCOSYLATED A1C: CPT

## 2024-09-04 ENCOUNTER — VIRTUAL VISIT (OUTPATIENT)
Dept: OTHER | Facility: CLINIC | Age: 49
End: 2024-09-04
Attending: INTERNAL MEDICINE
Payer: COMMERCIAL

## 2024-09-04 DIAGNOSIS — I10 BENIGN ESSENTIAL HYPERTENSION: ICD-10-CM

## 2024-09-04 DIAGNOSIS — I72.6 VERTEBRAL ARTERY ANEURYSM (H): ICD-10-CM

## 2024-09-04 DIAGNOSIS — R73.01 IFG (IMPAIRED FASTING GLUCOSE): ICD-10-CM

## 2024-09-04 DIAGNOSIS — E78.5 HYPERLIPIDEMIA LDL GOAL <70: ICD-10-CM

## 2024-09-04 PROCEDURE — G2211 COMPLEX E/M VISIT ADD ON: HCPCS | Mod: 95 | Performed by: INTERNAL MEDICINE

## 2024-09-04 PROCEDURE — 99214 OFFICE O/P EST MOD 30 MIN: CPT | Mod: 95 | Performed by: INTERNAL MEDICINE

## 2024-09-04 NOTE — PATIENT INSTRUCTIONS
Continue current medications and monitor blood pressure and pulse at home    Antiplatelet medications per neurosurgery service and follow the recommendations    Virtual or office visit in 6 months after the fasting labs order placed or sooner if any problems

## 2024-09-04 NOTE — PROGRESS NOTES
Silas is a 48 year old who is being evaluated via a billable video visit.    How would you like to obtain your AVS? MyChart  If the video visit is dropped, the invitation should be resent by: Text to cell phone: [phone]  Will anyone else be joining your video visit? 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     For full details please see my previous office visit notes        HPI: Silas Lima is a 48 year old year old patient very pleasant male originally from [place]  works for [employer] on February 13, 2024 working from home on his computer around noon he suddenly felt lightheaded sensation and double vision lasted for 5 minutes and his wife noticed his gait was lagging or disconjugate during that time.  He had another episode the same day and he came to the emergency room neuroexam was nonfocal but he underwent extensive evaluation including the head and neck CTA, MRI of the head etc..  CT and MRI showed evidence of 13 mm diameter partially thrombosed aneurysm associated with intracranial right vertebral artery likely at the PICA origin  He underwent DSA on February 14, 2024 right V4 segment  dissecting thrombosed aneurysm was noted.  He was initiated dual antiplatelet agent since then aspirin with Plavix tolerating without any problems.  Baseline LDL was 125 initiated atorvastatin 80 mg daily.  He gives a history of sometime in 2021 he developed neck pain he underwent chiropractic neck manipulation and after the manipulation he developed vertigo it was self resolved.  Then followed by he travel to [facility] in 2022 developed severe neck pain in the flight after he came back seen chiropractor couple times.  No other neck injury or motor vehicle accident  He has a history of hypertension taking amlodipine and this was adjusted during recent hospitalization and home blood pressure readings are well-controlled     After the discharge from the hospital he was seen and evaluated at [facility] and then at [facility].  He was seen few weeks ago  Dr. 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 The thrombosed part of the pseudoaneurysm is visualized better on T2 sequence of the MRI brain )    Successful endovascular treatment of a giant partially-thrombosed  symptomatic right post-PICA vertebral artery aneurysm with the Pipeline device. At [facility] 3/14/2024 by   Antiplatelet medications changed by neurosurgery service currently taking Brilinta 30 mg twice a day and baby aspirin daily, they are planning to continue Brilinta for 9 months  He is a lifetime candidate for aspirin  Scheduled to undergo cerebral angiogram next week follow the recommendations     2.  Hx of TIA (transient ischemic attack) 2/13/2024     For full details please see my previous notes     I have reviewed his recent imaging studies, neurosurgery evaluation done at [facility]     Based on his history and previous chiropractic neck manipulations likely developed dissection at that time which was further worsened nonthrombosed recently and he becomes symptomatic.  Fortunately no neurological deficits     He has no evidence of MFS, LDS, EDS on exam  No family history of arterial or organ rupture  Recent head and neck CTA no evidence of FMD in carotid arteries  Aortic arch and branch vessels looks good     Recent CTA CAP with leg run off , normal      Ok to take pepcid AC   Goal of blood pressure less than 130/80  Goal of LDL less than 70     He was advised avoiding any type of chiropractic neck manipulations or deep neck massage or roller coaster rides or heavy free weight lifting or sudden jerky neck movements etc.     Suggested follow neurosurgery recommendations and surveillance imaging studies  Continue antiplatelet medications per neurosurgery service        3. Hyperlipidemia LDL goal <70 (baseline )  Currently taking Crestor 20 mg daily excellent lipids LDL is 40 continue the same will repeat the lipid panel in 3 months then decide reducing the dose to 10 mg daily.  Follow therapeutic lifestyle modifications     4. Benign essential hypertension  5. Leg swelling from Amlodipine 10 mg , resolved after reducing the dose to 5 mg daily    If the blood pressure  persistently greater than 130 systolic, initiate Benicar 20 mg daily at nighttime new prescription sent during last office visit  If the leg swelling does not improve after reducing the amlodipine will eventually stop the amlodipine and adjust the dose of Benicar based on the blood pressure response       5. IFG (impaired fasting glucose)  Recent A1c 5.9 and previously 5.8  Follow diet and exercise  Will get A1c, fasting glucose along with other lipid panel in 6 months    Video visit start time: 4:00 PM  Video visit end time: 4:30 PM  Location the patient: At his home  Location the provider: [facility]    30 minutes spent on the date of the encounter doing chart review, outside records in the epic Care Everywhere, history, documentation and addressed above-mentioned issues..    AVS with written instructions done    The longitudinal care of plan for the above diagnoses was addressed during this visit. Due to added complexity of care, we will continue to supprt Silas Lima and the subsequent management of this/these conditions and with ongoing continuity of care for this/these conditions.     Follow-up in 6 months after fasting labs order placed    This visit is being conducted as a virtual visit due to the emphasis on mitigation of the COVID-19 virus pandemic. The clinician has decided that the risk of an in-office visit outweighs the benefit for this patient.         Nata Becerra MD,SHAD,FSVM,FNLA, FACP  Vascular Medicine  Clinical Hypertension Specialist   Clinical Lipidologist

## 2025-01-13 ENCOUNTER — APPOINTMENT (OUTPATIENT)
Dept: MRI IMAGING | Facility: CLINIC | Age: 50
End: 2025-01-13
Attending: EMERGENCY MEDICINE
Payer: COMMERCIAL

## 2025-01-13 ENCOUNTER — HOSPITAL ENCOUNTER (EMERGENCY)
Facility: CLINIC | Age: 50
Discharge: HOME | End: 2025-01-13
Attending: EMERGENCY MEDICINE | Admitting: EMERGENCY MEDICINE
Payer: COMMERCIAL

## 2025-01-13 VITALS
SYSTOLIC BLOOD PRESSURE: 142 MMHG | RESPIRATION RATE: 18 BRPM | TEMPERATURE: 97.3 F | DIASTOLIC BLOOD PRESSURE: 98 MMHG | HEART RATE: 80 BPM | OXYGEN SATURATION: 98 %

## 2025-01-13 DIAGNOSIS — H53.9 VISUAL DISTURBANCE: ICD-10-CM

## 2025-01-13 DIAGNOSIS — Z86.79 HISTORY OF ANEURYSM: ICD-10-CM

## 2025-01-13 LAB
ANION GAP SERPL CALCULATED.3IONS-SCNC: 11 MMOL/L (ref 7–15)
BASOPHILS # BLD AUTO: 0 10E3/UL (ref 0–0.2)
BASOPHILS NFR BLD AUTO: 1 %
BUN SERPL-MCNC: 14.3 MG/DL (ref 6–20)
CALCIUM SERPL-MCNC: 9.4 MG/DL (ref 8.8–10.4)
CHLORIDE SERPL-SCNC: 104 MMOL/L (ref 98–107)
CREAT SERPL-MCNC: 0.85 MG/DL (ref 0.67–1.17)
EGFRCR SERPLBLD CKD-EPI 2021: >90 ML/MIN/1.73M2
EOSINOPHIL # BLD AUTO: 0.1 10E3/UL (ref 0–0.7)
EOSINOPHIL NFR BLD AUTO: 1 %
ERYTHROCYTE [DISTWIDTH] IN BLOOD BY AUTOMATED COUNT: 12 % (ref 10–15)
GLUCOSE SERPL-MCNC: 160 MG/DL (ref 70–99)
HCO3 SERPL-SCNC: 25 MMOL/L (ref 22–29)
HCT VFR BLD AUTO: 44.4 % (ref 40–53)
HGB BLD-MCNC: 15.2 G/DL (ref 13.3–17.7)
HOLD SPECIMEN: NORMAL
HOLD SPECIMEN: NORMAL
IMM GRANULOCYTES # BLD: 0 10E3/UL
IMM GRANULOCYTES NFR BLD: 1 %
LYMPHOCYTES # BLD AUTO: 2.3 10E3/UL (ref 0.8–5.3)
LYMPHOCYTES NFR BLD AUTO: 33 %
MCH RBC QN AUTO: 29.5 PG (ref 26.5–33)
MCHC RBC AUTO-ENTMCNC: 34.2 G/DL (ref 31.5–36.5)
MCV RBC AUTO: 86 FL (ref 78–100)
MONOCYTES # BLD AUTO: 0.4 10E3/UL (ref 0–1.3)
MONOCYTES NFR BLD AUTO: 6 %
NEUTROPHILS # BLD AUTO: 4 10E3/UL (ref 1.6–8.3)
NEUTROPHILS NFR BLD AUTO: 58 %
NRBC # BLD AUTO: 0 10E3/UL
NRBC BLD AUTO-RTO: 0 /100
PLATELET # BLD AUTO: 297 10E3/UL (ref 150–450)
POTASSIUM SERPL-SCNC: 3.7 MMOL/L (ref 3.4–5.3)
RBC # BLD AUTO: 5.16 10E6/UL (ref 4.4–5.9)
SODIUM SERPL-SCNC: 140 MMOL/L (ref 135–145)
WBC # BLD AUTO: 7 10E3/UL (ref 4–11)

## 2025-01-13 PROCEDURE — 70553 MRI BRAIN STEM W/O & W/DYE: CPT

## 2025-01-13 PROCEDURE — 85025 COMPLETE CBC W/AUTO DIFF WBC: CPT | Performed by: EMERGENCY MEDICINE

## 2025-01-13 PROCEDURE — 85018 HEMOGLOBIN: CPT | Performed by: EMERGENCY MEDICINE

## 2025-01-13 PROCEDURE — 82310 ASSAY OF CALCIUM: CPT | Performed by: EMERGENCY MEDICINE

## 2025-01-13 PROCEDURE — 70544 MR ANGIOGRAPHY HEAD W/O DYE: CPT

## 2025-01-13 PROCEDURE — 80048 BASIC METABOLIC PNL TOTAL CA: CPT | Performed by: EMERGENCY MEDICINE

## 2025-01-13 PROCEDURE — 255N000002 HC RX 255 OP 636: Performed by: EMERGENCY MEDICINE

## 2025-01-13 PROCEDURE — 99285 EMERGENCY DEPT VISIT HI MDM: CPT | Mod: 25

## 2025-01-13 PROCEDURE — 85004 AUTOMATED DIFF WBC COUNT: CPT | Performed by: EMERGENCY MEDICINE

## 2025-01-13 PROCEDURE — A9585 GADOBUTROL INJECTION: HCPCS | Performed by: EMERGENCY MEDICINE

## 2025-01-13 PROCEDURE — 36415 COLL VENOUS BLD VENIPUNCTURE: CPT | Performed by: EMERGENCY MEDICINE

## 2025-01-13 RX ORDER — GADOBUTROL 604.72 MG/ML
6 INJECTION INTRAVENOUS ONCE
Status: COMPLETED | OUTPATIENT
Start: 2025-01-13 | End: 2025-01-13

## 2025-01-13 RX ADMIN — GADOBUTROL 6 ML: 604.72 INJECTION INTRAVENOUS at 18:31

## 2025-01-13 ASSESSMENT — ACTIVITIES OF DAILY LIVING (ADL)
ADLS_ACUITY_SCORE: 46

## 2025-01-13 ASSESSMENT — COLUMBIA-SUICIDE SEVERITY RATING SCALE - C-SSRS
6. HAVE YOU EVER DONE ANYTHING, STARTED TO DO ANYTHING, OR PREPARED TO DO ANYTHING TO END YOUR LIFE?: NO
1. IN THE PAST MONTH, HAVE YOU WISHED YOU WERE DEAD OR WISHED YOU COULD GO TO SLEEP AND NOT WAKE UP?: NO
2. HAVE YOU ACTUALLY HAD ANY THOUGHTS OF KILLING YOURSELF IN THE PAST MONTH?: NO

## 2025-01-13 NOTE — ED PROVIDER NOTES
Emergency Department Note      History of Present Illness     Chief Complaint   Eye Problem and Tingling (Lower lip)      HPI   Silas Lima is a 49 year old male with a history of hypertension, hyperlipidemia, aneurysm of right vertebral artery who presents to the ED for an evaluation of an eye problem and lower lip tingling. The patient reports that upon returning from [place] last Wednesday 1/8/25 he has had intermittent episodes of diplopia lasting about 10 seconds. He adds that he also stopped taking his Brilinta on 1/8 as well. For his episodes, he notes that he has found it hard to focus and that he sometimes has aura in his peripheral vision. Reports 4 episodes today. In his last episode he notes that he had developed a new room spinning sensation just while in bed resting. He also reports new tingling in his lower lip that began this morning. He denies any changes to his strength. Mentions history of aneurysm and was treated in March of 2024. States he sees Neurosurgeon Dr. Crews at [facility].     Independent Historian   None. Accompanied by wife.     Review of External Notes   Reviewed [facility] note from 20 August 2024    Past Medical History     Medical History and Problem List   Aneurysm of right vertebral artery  Hypertension  Hyperlipidemia  TIA    Medications   Amlodipine  Aspirin  Rosuvastatin  Brilinta    Surgical History   Carotid cerebral angiogram bilateral    Physical Exam     Patient Vitals for the past 24 hrs:   BP Temp Temp src Pulse Resp SpO2   01/13/25 1953 (!) 142/98 -- -- -- 18 98 %   01/13/25 1843 (!) 137/98 -- -- 80 20 96 %   01/13/25 1313 (!) 166/96 97.3  F (36.3  C) Temporal 97 18 100 %     Physical Exam  Physical Exam   Nursing note and vitals reviewed.  General: Oriented to person, place, and time. Appears well-developed and well-nourished.   Head: No signs of trauma.   Mouth/Throat: Oropharynx is clear and moist.   Eyes: Conjunctivae are normal. Pupils are equal, round,  and reactive to light.   Neck: Normal range of motion. No nuchal rigidity.   Cardiovascular: Normal rate and regular rhythm.    Respiratory: Effort normal and breath sounds normal. No respiratory distress.   Abdominal: Soft. There is no tenderness. There is no guarding.   Musculoskeletal: Normal range of motion. no edema.   Neurological: The patient is alert and oriented to person, place, and time.  PERRLA, EOMI, visual fields intact, strength in upper/lower extremities normal and symmetrical. Extraocular movements are intact. No diplopia.  Visual fields intact  Sensation normal. Speech normal  GCS eye subscore is 4. GCS verbal subscore is 5. GCS motor subscore is 6.   Skin: Skin is warm and dry. No rash noted.   Psychiatric: normal mood and affect. behavior is normal.           Diagnostics     Lab Results   Labs Ordered and Resulted from Time of ED Arrival to Time of ED Departure   BASIC METABOLIC PANEL - Abnormal       Result Value    Sodium 140      Potassium 3.7      Chloride 104      Carbon Dioxide (CO2) 25      Anion Gap 11      Urea Nitrogen 14.3      Creatinine 0.85      GFR Estimate >90      Calcium 9.4      Glucose 160 (*)    CBC WITH PLATELETS AND DIFFERENTIAL    WBC Count 7.0      RBC Count 5.16      Hemoglobin 15.2      Hematocrit 44.4      MCV 86      MCH 29.5      MCHC 34.2      RDW 12.0      Platelet Count 297      % Neutrophils 58      % Lymphocytes 33      % Monocytes 6      % Eosinophils 1      % Basophils 1      % Immature Granulocytes 1      NRBCs per 100 WBC 0      Absolute Neutrophils 4.0      Absolute Lymphocytes 2.3      Absolute Monocytes 0.4      Absolute Eosinophils 0.1      Absolute Basophils 0.0      Absolute Immature Granulocytes 0.0      Absolute NRBCs 0.0         Imaging   MR Brain w/o & w Contrast   Final Result   IMPRESSION:   HEAD MRI:    1.  No finding for acute infarct, hemorrhage, or mass.   2.  Mild presumed sequela of chronic microvascular ischemic change.   3.  V4 segment  vertebral artery aneurysm on the right appears very similar in size compared to the 02/13/2024 exam allowing for differences in technique.      HEAD MRA:    1.  Patient is status post stenting of the right vertebral artery and exclusion of the previously seen large laterally projecting V4 segment aneurysm. The stent appears patent. No flow-related enhancement is seen within the excluded aneurysm and the    aneurysm appears similar to minimally decreased in size compared to the prior.      MRA Brain ([place]) wo Contrast   Final Result   IMPRESSION:   HEAD MRI:    1.  No finding for acute infarct, hemorrhage, or mass.   2.  Mild presumed sequela of chronic microvascular ischemic change.   3.  V4 segment vertebral artery aneurysm on the right appears very similar in size compared to the 02/13/2024 exam allowing for differences in technique.      HEAD MRA:    1.  Patient is status post stenting of the right vertebral artery and exclusion of the previously seen large laterally projecting V4 segment aneurysm. The stent appears patent. No flow-related enhancement is seen within the excluded aneurysm and the    aneurysm appears similar to minimally decreased in size compared to the prior.                  Independent Interpretation   None    ED Course      Medications Administered   Medications   gadobutrol (GADAVIST) injection 6 mL (6 mLs Intravenous $Given 1/13/25 1831)   ticagrelor (BRILINTA) tablet 60 mg (60 mg Oral Not Given 1/13/25 2010)       Procedures   Procedures     Discussion of Management   I consulted with Dr. Royal from neurosurgery    ED Course   ED Course as of 01/13/25 2134 Mon Jan 13, 2025   1406 I obtained history and examined the patient as noted above.     1420 I spoke with [facility] neurosurgery regarding the patient.    1434 I spoke with Dr. Royal regarding care of the patient.    1443 I rechecked and updated the patient.         Additional Documentation  None    Medical Decision  Making / Diagnosis     ARTURO Lima is a 49 year old male presents to the ED with a significant past medical history of repaired cerebral aneurysm.  The patient is now having intermittent diplopia after recently stopping Brilinta.  The patient is not having symptoms now.  The patient did not have symptoms while in the ED.  I spoke with neurosurgery and we obtained MRI brain and MRA head.  The patient was observed in the ED.  I spoke with Dr. Royal after results of MRI/MRA were obtained.  He is recommending follow-up in the outpatient clinic and restarting the patient on Brilinta.  The patient is wife are in agreement with this plan but if the patient were to continue to have symptoms he will return to the ED.    Disposition   The patient was discharged.     Diagnosis     ICD-10-CM    1. Visual disturbance  H53.9       2. History of aneurysm  Z86.79            Discharge Medications   Discharge Medication List as of 1/13/2025  8:11 PM        The patient will restart on his Brilinta.  The patient has the medication at home.    Scribe Disclosure:  I, Thu Mulligan, am serving as a scribe at 2:04 PM on 1/13/2025 to document services personally performed by Enio Hickman MD based on my observations and the provider's statements to me.        Enio Hickman MD  01/13/25 3703

## 2025-01-13 NOTE — ED TRIAGE NOTES
Pt presents to triage with C/O double vision and numbness/tingling in lower lip; onset of symptoms 4 days ago. Pt states sensation is intermittent but more frequent today. Pt reports hx aneurysm that was treated surgically.      Triage Assessment (Adult)       Row Name 01/13/25 1315          Triage Assessment    Airway WDL WDL        Respiratory WDL    Respiratory WDL WDL        Skin Circulation/Temperature WDL    Skin Circulation/Temperature WDL WDL        Cardiac WDL    Cardiac WDL WDL        Peripheral/Neurovascular WDL    Peripheral Neurovascular WDL WDL        Cognitive/Neuro/Behavioral WDL    Cognitive/Neuro/Behavioral WDL WDL

## 2025-01-15 ENCOUNTER — TELEPHONE (OUTPATIENT)
Dept: NEUROSURGERY | Facility: CLINIC | Age: 50
End: 2025-01-15
Payer: COMMERCIAL

## 2025-01-15 NOTE — TELEPHONE ENCOUNTER
RN called patient back. Per discussion with Dr. Royal yesterday patient should restart and stay on Brilinta at least until seeing Dr. Royal. Patient procedure was done at [facility] in 3/2024. Patient would like to re-establish care with Dr. Royal now. He was informed by [facility] team that he could discontinue Brilinta at the start of the new year. This caused him to present with intermittent diplopia to the ED on 1/13/25. Patient has since restarted Brilinta but it is no longer covered by his insurance. He asked if there is a generic brand he could take and RN informed him there is unfortunately not a generic substitution for Brilinta at this time. Patient insisted he can pay for Brilinta out of pocket until he sees Dr. Royal on 3/4/25. RN let him know if any slots open up prior to then I will be sure to let patient know and offer appt to him. Patient was very pleasant and pleased with our conversation. He will call us back with any other questions or concerns.   Jacqueline Burks RN 1/15/2025 9:39 AM

## 2025-01-15 NOTE — TELEPHONE ENCOUNTER
Health Call Center    Phone Message    May a detailed message be left on voicemail: yes     Reason for Call: Medication Question or concern regarding medication   Prescription Clarification  Name of Medication: Brilinta 30mg tabs 2 x daily    Prescribing Provider: n/a   Pharmacy: n/a     What on the order needs clarification? Patient recently seen in ED for episodes and symptoms of diplopia. Pt believes symptoms caused due to after a year of taking med, he stopped for a week and started having these symptoms. Pt currently scheduled for soonest availability + wait list  With Dr Royal on 3/4/25. Please review medication and discuss with patient as he is wondering if this is a long term plan to go back on medication or if able to get in sooner with provider to discuss further.       Action Taken: Other: [facility]    Travel Screening: Not Applicable     Date of Service:                                                                       Never smoker

## 2025-02-03 ENCOUNTER — TELEPHONE (OUTPATIENT)
Dept: NEUROSURGERY | Facility: CLINIC | Age: 50
End: 2025-02-03
Payer: COMMERCIAL

## 2025-02-03 NOTE — TELEPHONE ENCOUNTER
[facility]    Phone Message    May a detailed message be left on voicemail: yes     Reason for Call: Pt is requesting to speak with a nurse about some symptoms he is having.  He didn't provide details on the symptoms.  Please call him back to discuss.

## 2025-02-03 NOTE — TELEPHONE ENCOUNTER
RN called patient back to discuss symptoms. Patient states after we spoke won 1/15/25 he did go back to his [facility] team and let them know that Brilinta was not covered by his insurance. On the phone call with writer on 1/15/25 patient stated this would not be a problem and he will pay out of pocket for it until his appointment with Dr. Royal. He now informs RN that his [facility] team ok'd for him to go back on Plavix instead of Brilinta due to lack of insurance coverage. Patient states his symptoms have continued to come and go but he will be having a normal day and then will notice a dull tingling and slight numbness in his toes and sometimes around his mouth. He states this feeling sort of moves around but he primarily notices it in his feet and on mouth. He equates the feeling to when your foot is falling asleep but it does subside quickly. Patient denies any issues with balance, changes in vision, facial droop, weakness or changes in speech. RN informed patient that Dr. Royal is out of town this week but based on last conversation Dr. Royal would want patient to be back on brilinta, not Plavix. RN informed patient to reach out to [facility] team regarding symptoms as they did his procedure and are managing his medications. Patient states he will informed [facility] of symptoms. Patient states he is no longer interested in following with [facility] as he feels he is not being supported in his care there. He fully intends to transition his care back to Dr. Royal but would like to be seen sooner than his currently scheduled appointment on 3/4/25. RN informed patient I can talk with Dr. Royal HAYLEY and determine if she may be able to see patient sooner. Patient appreciative of that suggestion and RN will be in touch with patient after speaking with Malika Garcia. Patient has no further questions or concerns and has our contact information for further issues. Message sent to Malika Garcia to advise on next  steps if she can see or wait for Dr. Royal appointment on 3/4/25.  Jacqueline Burks, RN 2/3/2025 9:49 AM

## 2025-02-18 ENCOUNTER — VIRTUAL VISIT (OUTPATIENT)
Dept: NEUROSURGERY | Facility: CLINIC | Age: 50
End: 2025-02-18
Payer: COMMERCIAL

## 2025-02-18 ENCOUNTER — MYC MEDICAL ADVICE (OUTPATIENT)
Dept: NEUROLOGY | Facility: CLINIC | Age: 50
End: 2025-02-18
Payer: COMMERCIAL

## 2025-02-18 VITALS — WEIGHT: 142 LBS | HEIGHT: 67 IN | BODY MASS INDEX: 22.29 KG/M2

## 2025-02-18 ASSESSMENT — PAIN SCALES - GENERAL: PAINLEVEL_OUTOF10: NO PAIN (0)

## 2025-02-18 NOTE — PROGRESS NOTES
Virtual Visit Details  Date of service: 2/18/2025  Type of service:  Telephone Visit   Phone call duration: 20 minutes   Originating Location (pt. Location): Home  {PROVIDER LOCATION On-site should be selected for visits conducted from your clinic location or adjoining [facility] hospital, academic office, or other nearby [facility] building. Off-site should be selected for all other provider locations, including home:[id number]}  Distant Location (provider location):  On-site  Telephone visit completed due to the patient did not have access to video, while the distant provider did.    Symptoms have resolved. No further diplopia. Stopped Brilinta abruptly instead of tapering as instructed.   Now on Plavix due to insurance issues. Gets sporadic tingling in foot and face. Few minutes at a time. Very random. Started Jan 26th  On a statin rosuvastatin 20 mg  Last two weeks of rash on back and neck - now resolved with benadryl.    ASA/Plavix - continue for one additional month if possible; if rash continues to be an issue, then stop plavix and continue aspirin 325 mg (not 81 mg)  Follow up one month, no imaging

## 2025-02-18 NOTE — TELEPHONE ENCOUNTER
RN called patient to see if he would like a phone or in person appt with Dr. Royal today at 2:30pm. RN LVMM for patient letting him know I was calling regarding an open appointment with Dr. Royal this afternoon and to please respond via [employer] if he would like the appointment. Patient currently scheduled for 3/4/25 but had indicated he would like a sooner appointment if possible.   Jacqueline Burks RN 2/18/2025 9:14 AM

## 2025-02-18 NOTE — NURSING NOTE
Current patient location: [address] [place], [place] [address]Y[place] GAYATRI  [facility] [address]    Is the patient currently in the state of [place]? YES    Visit mode: TELEPHONE    If the visit is dropped, the patient can be reconnected by:TELEPHONE VISIT: Phone number:   Telephone Information:   Mobile [phone]       Will anyone else be joining the visit? NO  (If patient encounters technical issues they should call [phone])    Are changes needed to the allergy or medication list? No    Are refills needed on medications prescribed by this physician? NO    Rooming Documentation:  Questionnaire(s) not done per department protocol    Reason for visit: Follow Up    Aruna ROMERO

## 2025-03-03 ENCOUNTER — TELEPHONE (OUTPATIENT)
Dept: NEUROSURGERY | Facility: CLINIC | Age: 50
End: 2025-03-03
Payer: COMMERCIAL

## 2025-03-03 NOTE — TELEPHONE ENCOUNTER
Left pt a detailed message- His Appt on March 18th has been changed to virtual. Pt was asked to call back if he would like to reschedule.

## 2025-03-10 ENCOUNTER — LAB (OUTPATIENT)
Dept: LAB | Facility: CLINIC | Age: 50
End: 2025-03-10
Payer: COMMERCIAL

## 2025-03-10 DIAGNOSIS — E78.5 HYPERLIPIDEMIA LDL GOAL <70: ICD-10-CM

## 2025-03-10 DIAGNOSIS — R73.01 IFG (IMPAIRED FASTING GLUCOSE): ICD-10-CM

## 2025-03-10 LAB
ALBUMIN SERPL BCG-MCNC: 4.3 G/DL (ref 3.5–5.2)
ALP SERPL-CCNC: 83 U/L (ref 40–150)
ALT SERPL W P-5'-P-CCNC: 36 U/L (ref 0–70)
ANION GAP SERPL CALCULATED.3IONS-SCNC: 9 MMOL/L (ref 7–15)
AST SERPL W P-5'-P-CCNC: 34 U/L (ref 0–45)
BILIRUB SERPL-MCNC: 0.7 MG/DL
BUN SERPL-MCNC: 11.2 MG/DL (ref 6–20)
CALCIUM SERPL-MCNC: 9.2 MG/DL (ref 8.8–10.4)
CHLORIDE SERPL-SCNC: 107 MMOL/L (ref 98–107)
CHOLEST SERPL-MCNC: 122 MG/DL
CREAT SERPL-MCNC: 0.95 MG/DL (ref 0.67–1.17)
EGFRCR SERPLBLD CKD-EPI 2021: >90 ML/MIN/1.73M2
EST. AVERAGE GLUCOSE BLD GHB EST-MCNC: 117 MG/DL
FASTING STATUS PATIENT QL REPORTED: YES
FASTING STATUS PATIENT QL REPORTED: YES
GLUCOSE SERPL-MCNC: 101 MG/DL (ref 70–99)
HBA1C MFR BLD: 5.7 % (ref 0–5.6)
HCO3 SERPL-SCNC: 25 MMOL/L (ref 22–29)
HDLC SERPL-MCNC: 43 MG/DL
LDLC SERPL CALC-MCNC: 58 MG/DL
NONHDLC SERPL-MCNC: 79 MG/DL
POTASSIUM SERPL-SCNC: 4 MMOL/L (ref 3.4–5.3)
PROT SERPL-MCNC: 6.7 G/DL (ref 6.4–8.3)
SODIUM SERPL-SCNC: 141 MMOL/L (ref 135–145)
TRIGL SERPL-MCNC: 103 MG/DL

## 2025-03-10 PROCEDURE — 36415 COLL VENOUS BLD VENIPUNCTURE: CPT

## 2025-03-10 PROCEDURE — 80053 COMPREHEN METABOLIC PANEL: CPT

## 2025-03-10 PROCEDURE — 80061 LIPID PANEL: CPT

## 2025-03-10 PROCEDURE — 83036 HEMOGLOBIN GLYCOSYLATED A1C: CPT

## 2025-03-18 ENCOUNTER — VIRTUAL VISIT (OUTPATIENT)
Dept: NEUROSURGERY | Facility: CLINIC | Age: 50
End: 2025-03-18
Attending: NEUROLOGICAL SURGERY
Payer: COMMERCIAL

## 2025-03-18 ENCOUNTER — OFFICE VISIT (OUTPATIENT)
Dept: OTHER | Facility: CLINIC | Age: 50
End: 2025-03-18
Attending: INTERNAL MEDICINE
Payer: COMMERCIAL

## 2025-03-18 VITALS
OXYGEN SATURATION: 99 % | HEART RATE: 78 BPM | SYSTOLIC BLOOD PRESSURE: 144 MMHG | WEIGHT: 146.6 LBS | DIASTOLIC BLOOD PRESSURE: 87 MMHG | BODY MASS INDEX: 22.96 KG/M2

## 2025-03-18 VITALS — HEIGHT: 67 IN | WEIGHT: 140 LBS | BODY MASS INDEX: 21.97 KG/M2

## 2025-03-18 DIAGNOSIS — G93.5 COMPRESSION OF BRAINSTEM (H): ICD-10-CM

## 2025-03-18 DIAGNOSIS — I67.1 CEREBRAL ANEURYSM, NONRUPTURED: Primary | ICD-10-CM

## 2025-03-18 DIAGNOSIS — R73.01 IFG (IMPAIRED FASTING GLUCOSE): ICD-10-CM

## 2025-03-18 DIAGNOSIS — I72.6 VERTEBRAL ARTERY ANEURYSM: Primary | ICD-10-CM

## 2025-03-18 DIAGNOSIS — I10 BENIGN ESSENTIAL HYPERTENSION: ICD-10-CM

## 2025-03-18 DIAGNOSIS — E78.5 HYPERLIPIDEMIA LDL GOAL <70: ICD-10-CM

## 2025-03-18 PROCEDURE — 3079F DIAST BP 80-89 MM HG: CPT | Performed by: INTERNAL MEDICINE

## 2025-03-18 PROCEDURE — G2211 COMPLEX E/M VISIT ADD ON: HCPCS | Performed by: INTERNAL MEDICINE

## 2025-03-18 PROCEDURE — 99214 OFFICE O/P EST MOD 30 MIN: CPT | Performed by: INTERNAL MEDICINE

## 2025-03-18 PROCEDURE — 99213 OFFICE O/P EST LOW 20 MIN: CPT | Performed by: INTERNAL MEDICINE

## 2025-03-18 PROCEDURE — 3077F SYST BP >= 140 MM HG: CPT | Performed by: INTERNAL MEDICINE

## 2025-03-18 RX ORDER — CLOPIDOGREL BISULFATE 75 MG/1
75 TABLET ORAL
COMMUNITY
Start: 2025-01-17

## 2025-03-18 RX ORDER — AMLODIPINE BESYLATE 5 MG/1
5 TABLET ORAL DAILY
Qty: 90 TABLET | Refills: 3 | Status: SHIPPED | OUTPATIENT
Start: 2025-03-18

## 2025-03-18 RX ORDER — ROSUVASTATIN CALCIUM 20 MG/1
20 TABLET, COATED ORAL DAILY
Qty: 90 TABLET | Refills: 3 | Status: SHIPPED | OUTPATIENT
Start: 2025-03-18

## 2025-03-18 ASSESSMENT — PAIN SCALES - GENERAL: PAINLEVEL_OUTOF10: NO PAIN (0)

## 2025-03-18 NOTE — LETTER
3/18/2025       RE: Silas Lima  [address]  [place] [address]     Dear Colleague,    Thank you for referring your patient, Silas Lima, to the [facility] [place] at [facility]. Please see a copy of my visit note below.    Virtual Visit Details  Date of service: 3/18/2025  Type of service:  Video Visit   Length of service: 20 minutes  Originating Location (pt. Location): Home    Distant Location (provider location):  Off-site  Platform used for Video Visit: Moreno Anglin MD   [address] [place] [address]      Dear Dr. Silva:    We saw Mr. Lima as part of a virtual video visit in cerebrovascular clinic today.  Please see our note from last month for additional details.  He was accompanied by his wife on this visit.  He has been doing reasonably well over the past month.  Both the paresthesias and the rash have essentially resolved.  He describes some intermittent eyelid fluttering that does not seem to have any particular pattern.  He is currently on aspirin 81 mg and clopidogrel 75 mg daily.    Over the video platform, his general appearance is good.  His speech and language are normal.  Facial strength is normal.  Extraocular movements are full.  There is no ptosis.  He was moving the upper extremities with good strength.    There were no new images to review.    Based on his clinical stability, we instructed him to discontinue the Plavix and stay on aspirin 325 mg daily.  [place] and would like to stay on the dual antiplatelet therapy until he returns.  This is reasonable, and we will keep him on the dual antiplatelet therapy until 4/15/2025.  At that time, we will discontinue the clopidogrel and increase the aspirin to 325 mg daily.  He will be due for a follow-up MRI and MRA of the brain in about 5 months (about 6 months from the last study).  We will send him a copy of the  recent notes that he can take on his trip, should he need to seek medical attention.    Please do not hesitate to contact us with questions.    Sincerely,        Larry Royal MD  Department of Neurosurgery      Again, thank you for allowing me to participate in the care of your patient.      Sincerely,    aLrry Royal MD

## 2025-03-18 NOTE — NURSING NOTE
Current patient location: [address] [place], [place] [address]  [facility] [address]    Is the patient currently in the state of [place]? YES    Visit mode: VIDEO    If the visit is dropped, the patient can be reconnected by:VIDEO VISIT: Text to cell phone:   Telephone Information:   Mobile [phone]       Will anyone else be joining the visit? NO  (If patient encounters technical issues they should call [phone])    Are changes needed to the allergy or medication list? No    Are refills needed on medications prescribed by this physician? NO    Rooming Documentation:  Not applicable    Reason for visit: ARNAUD [place] VVF

## 2025-03-18 NOTE — PROGRESS NOTES
Virtual Visit Details  Date of service: 3/18/2025  Type of service:  Video Visit   Length of service: 20 minutes  Originating Location (pt. Location): Home    Distant Location (provider location):  Off-site  Platform used for Video Visit: Moreno Anglin MD   [address] [place] [address]      Dear Dr. Silva:    We saw Mr. Lima as part of a virtual video visit in cerebrovascular clinic today.  Please see our note from last month for additional details.  He was accompanied by his wife on this visit.  He has been doing reasonably well over the past month.  Both the paresthesias and the rash have essentially resolved.  He describes some intermittent eyelid fluttering that does not seem to have any particular pattern.  He is currently on aspirin 81 mg and clopidogrel 75 mg daily.    Over the video platform, his general appearance is good.  His speech and language are normal.  Facial strength is normal.  Extraocular movements are full.  There is no ptosis.  He was moving the upper extremities with good strength.    There were no new images to review.    Based on his clinical stability, we instructed him to discontinue the Plavix and stay on aspirin 325 mg daily.  He is scheduled to travel [place] and would like to stay on the dual antiplatelet therapy until he returns.  This is reasonable, and we will keep him on the dual antiplatelet therapy until 4/15/2025.  At that time, we will discontinue the clopidogrel and increase the aspirin to 325 mg daily.  He will be due for a follow-up MRI and MRA of the brain in about 5 months (about 6 months from the last study).  We will send him a copy of the recent notes that he can take on his trip, should he need to seek medical attention.    Please do not hesitate to contact us with questions.    Sincerely,        Larry Royal MD  Department of Neurosurgery

## 2025-03-18 NOTE — PATIENT INSTRUCTIONS
Follow neurosurgery recommendations and complete plavix then go back to aspirin 325 mg daily     Recent labs looks good     Follow up in 6 months after labs , order placed

## 2025-03-18 NOTE — PROGRESS NOTES
[facility]        Patient is here for a  follow up.    Pt is currently taking Aspirin, Statin, and Plavix.    BP (!) 144/87 (BP Location: Right arm, Patient Position: Chair, Cuff Size: Adult Regular)   Pulse 78   Wt 146 lb 9.6 oz (66.5 kg)   SpO2 99%   BMI 22.96 kg/m      The provider has been notified that the patient has no concerns.     Questions patient would like addressed today are: N/A.    Refills are needed: N/A    Has homecare services and agency name:  Victoria Longoria MA

## 2025-03-19 NOTE — PROGRESS NOTES
[facility] VASCULAR MEDICINE FOLLOW UP     PRIMARY HEALTH CARE PROVIDER:  Moreno Silva    REASON FOR VISIT:    Follow up visit    Traveled to [place] then followed by he stopped Brilinta instead of weaning off then developed diplopia like symptoms seen in the emergency room with symptoms of diplopia, lower lip tingling sensation.  Diplopia lasted 10 seconds or less.  He was also having difficulty to focus , total 4 episodes of diplopia then presented to the emergency room.  He also complains some spinning sensation of the room.  Underwent MRI/MRA which was unremarkable postprocedural changes noted.  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  Stent was patent  He underwent laboratory tests recently here for follow-up  He denies any headache, dizziness, lightheadedness  Symptoms totally resolved    He was seen and evaluated by neurosurgeon Dr. 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 90 tablet 3    aspirin 81 MG EC tablet Take 1 tablet (81 mg) by mouth daily 90 tablet 3    clopidogrel (PLAVIX) 75 MG tablet Take 75 mg by mouth.      rosuvastatin (CRESTOR) 20 MG tablet Take 1 tablet (20 mg) by mouth daily. 90 tablet 3     No current facility-administered medications for this visit.       PAST SURGICAL HISTORY:  Past Surgical History:   Procedure Laterality Date    IR CAROTID CEREBRAL ANGIOGRAM BILATERAL  2/14/2024       ALLERGIES   No Known Allergies    FAMILY HISTORY  Family History   Problem Relation Age of Onset    C.A.D. Father     C.A.D. Paternal Grandfather     Heart Disease Father     Heart Disease Paternal Grandfather     Diabetes Father     Diabetes Maternal Grandmother     Diabetes Paternal Grandfather     Hypertension Paternal Grandmother     Hypertension Paternal Grandfather     Cerebrovascular Disease Paternal Grandfather     Lipids Paternal Grandfather        VASCULAR FAMILY HISTORY  1st order relative with atherosclerotic PAD: No  1st order relative with AAA: No  Family history of Familial Hyperlipidemia No  Family History of Hypercoagulable state:No    VASCULAR RISK FACTORS  1. Diabetes:No   2. Smoking: has never smoked.  3. HTN: controlled  4.Hyperlipidemia: Yes - controlled      SOCIAL HISTORY  Social History     Socioeconomic History    Marital status:      Spouse name: Not on file    Number of children: 0    Years of education: Not on file    Highest education level: Not on file   Occupational History    Occupation:       Employer: [employer]   Tobacco Use    Smoking status: Never    Smokeless tobacco: Never   Substance  and Sexual Activity    Alcohol use: Not Currently     Comment: 1-2 per wk    Drug use: No    Sexual activity: Yes     Partners: Female   Other Topics Concern    Not on file   Social History Narrative    Not on file     Social Drivers of Health     Financial Resource Strain: Not on file   Food Insecurity: Not on file   Transportation Needs: Not on file   Physical Activity: Not on file   Stress: Not on file   Social Connections: Unknown (6/6/2022)    Received from [facility], [facility]    Social Connections     Frequency of Communication with Friends and Family: Not on file   Interpersonal Safety: Not on file   Housing Stability: Not on file       ROS:   General: No change in weight, sleep or appetite.  Normal energy.  No fever or chills  Eyes: Negative for vision changes or eye problems  ENT: No problems with ears, nose or throat.  No difficulty swallowing.  Resp: No coughing, wheezing or shortness of breath  CV: No chest pains or palpitations  GI: No nausea, vomiting,  heartburn, abdominal pain, diarrhea, constipation or change in bowel habits  : No urinary frequency or dysuria, bladder or kidney problems  Musculoskeletal: No significant muscle or joint pains  Neurologic: On February 14, 2024 was admitted to the hospital with sudden history of lightheaded sensation and double vision for 5 minutes and also his wife noticed Ray was lagging or disconjugate during that time and he has a recurrent episode  Psychiatric: No problems with anxiety, depression or mental health  Heme/immune/allergy: No history of bleeding or clotting problems or anemia.  No allergies or immune system problems  Endocrine: No history of thyroid disease, diabetes or other endocrine disorders  Skin: No rashes,worrisome lesions or skin problems  Vascular:  No claudication, lifestyle limiting or otherwise; no ischemic rest pain; no non-healing ulcers. No weakness, No loss of  sensation      EXAM:  BP (!) 144/87 (BP Location: Right arm, Patient Position: Chair, Cuff Size: Adult Regular)   Pulse 78   Wt 146 lb 9.6 oz (66.5 kg)   SpO2 99%   BMI 22.96 kg/m    In general, the patient is a pleasant male in no apparent distress.    HEENT: NC/AT.  PERRLA.  EOMI.  Sclerae white, not injected.  Nares clear.  Pharynx without erythema or exudate.  Dentition intact.    Neck: No adenopathy.  No thyromegaly. Carotids +2/2 bilaterally without bruits.  No jugular venous distension.   Heart: RRR. Normal S1, S2 splits physiologically. No murmur, rub, click, or gallop. The PMI is in the 5th ICS in the midclavicular line. There is no heave.    Lungs: CTA.  No ronchi, wheezes, rales.  No dullness to percussion.   Abdomen: Soft, nontender, nondistended. No organomegaly. No AAA.  No bruits.   Extremities: No clubbing, cyanosis, or edema.  No wounds.       Labs:  LIPID RESULTS:  Lab Results   Component Value Date    CHOL 122 03/10/2025    HDL 43 03/10/2025    LDL 58 03/10/2025    TRIG 103 03/10/2025       LIVER ENZYME RESULTS:  Lab Results   Component Value Date    AST 34 03/10/2025    AST 55 01/25/2006    ALT 36 03/10/2025    ALT 57 01/25/2006       CBC RESULTS:  Lab Results   Component Value Date    WBC 7.0 01/13/2025    WBC 12.5 (H) 01/25/2006    RBC 5.16 01/13/2025    RBC 4.62 01/25/2006    HGB 15.2 01/13/2025    HGB 13.4 01/25/2006    HCT 44.4 01/13/2025    HCT 39.9 (L) 01/25/2006    MCV 86 01/13/2025    MCV 86 01/25/2006    MCH 29.5 01/13/2025    MCH 29.0 01/25/2006    MCHC 34.2 01/13/2025    MCHC 33.6 01/25/2006    RDW 12.0 01/13/2025    RDW 13.5 01/25/2006     01/13/2025     01/25/2006       BMP RESULTS:  Lab Results   Component Value Date     03/10/2025     01/25/2006    POTASSIUM 4.0 03/10/2025    POTASSIUM 3.8 01/25/2006    CHLORIDE 107 03/10/2025    CHLORIDE 99 01/25/2006    CO2 25 03/10/2025    CO2 27 01/25/2006    ANIONGAP 9 03/10/2025    ANIONGAP 13 01/25/2006    GLC  101 (H) 03/10/2025     (H) 02/14/2024     (H) 01/25/2006    BUN 11.2 03/10/2025    BUN 13 01/25/2006    CR 0.95 03/10/2025    CR 0.95 01/25/2006    GFRESTIMATED >90 03/10/2025    GFRESTIMATED >80 01/25/2006    GFRESTBLACK >80 01/25/2006    JENIFER 9.2 03/10/2025    JENIFER 8.9 01/25/2006        A1C RESULTS:  Lab Results   Component Value Date    A1C 5.7 (H) 03/10/2025         Procedures:     MRI/Head CT - CT: 13 mm hyperdensity in the right premedullary cistern with mild mass effect on the medulla   - MRI: Corresponding to the abnormality on the recent head CT/CTA, there is evidence of a 13 mm diameter partially thrombosed aneurysm associated with the intracranial right vertebral artery.    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      MRA - 13 mm partially thrombosed presumed dissecting aneurysm associated with the V4 segment of the right vertebral artery.      DSA 2/14: Right V4 segment dissecting thrombosed aneurysm    Cervical Vasculature No large vessel occlusion or hemodynamically significant stenosis       Echocardiogram 65% EF, No regional wall motion  abnormalities, normal atrial sizes,  normal left ventricular wall thickness   EKG/Telemetry SR   Other Testing Not Applicable       LDL  2/14/2024: 125 mg/dL   A1C  2/13/2024: 5.8 %   Troponin 2/13/2024: <6 ng/L     Cerebral Angiography Report   [id number]  BELLO ZAMBRANO  1975 2/14/2024 2:15 PM     Brief History:  48-year-old man with history of hypertension presenting with acute  onset posterior circulation symptoms that are now completely resolved.  These symptoms include vertigo, double vision that lasted for about 5  minutes and disconjugate gaze noted on examination by wife. Brain  imaging at this far reveals a thrombosed aneurysm of the right  vertebral artery V4 segment which measures at least 13 mm on MRI T2  sequence.     Indication for the procedure: Evaluation of intracranial  aneurysm.     Attending/s: Larry Royal MD  Fellow/s: Toby Mcgraw MD, Coral Hu MD     Anesthesia: Local anesthesia and conscious sedation  Fluoro time: 10 minutes  Fluoro dose: 292 mGy  Contrast used: 40 mL of Visi-320  Sedation time: 30 minutes of 1:1 sedation monitoring by the physician  Sedatives: IV Fentanyl 100 mcg, IV Midazolam 2 mg.   Other Medications: Subcutaneous 1% lidocaine, heparin 5000 units     Procedures:  1.  Diagnostic cervicocerebral angiography and interpretation of the  images.  2.  Right common femoral arteriotomy  3.  Diagnostic angiography of the right common femoral artery.  4.  Selective catheterization and diagnostic cerebral angiography of  the right vertebral artery, left vertebral artery  5.  Percutaneous closure of the right femoral arteriotomy by using 6F  Angioseal Closure device..     Devices and/or Implants Used:  [place] tip H1 catheter  0.035 inch Glidewire  Angle taper catheter  5 Macedonian short sheath     Consent:   The risks and benefits of a conventional diagnostic cerebral  angiography were discussed with the patient and/or patient's family at  the bedside who agreed to proceed. The risks, which were discussed  included risk of stroke, arterial dissection, groin hematoma,  arteriovenous fistula in the groin and pseudoaneurysm of the femoral  artery. If and when radial artery approach is used, the risks  discussed included radial artery occlusion, hand ischemia. Verbal and  written informed consent was obtained.     Description of Procedure:  Patient was brought to the angiography suite and placed in supine  position. Medications were administered by the radiology nursing  staff, and the vital signs were monitored throughout procedure under  1:1 physician monitoring. The patient was prepped and draped in a  sterile fashion. A timeout was performed prior to start of the  procedure.      The right common femoral artery was palpated using standard  anatomic  landmarks. 1% of lidocaine was injected locally and a small incision  was made on the skin overlying the femoral artery using a 11-blade  scalpel. A 21-gauge needle was placed into the right femoral artery,  which was then exchanged for a 4-Kinyarwanda dilator over a microwire. The  4-Kinyarwanda dilator was then exchanged for a 5-Kinyarwanda sheath over a  J-wire using the modified Seldinger's technique. The sheath was  connected to a continuous flush of heparinized saline. We initially  attempted catheterization of the right vertebral artery using the  angled taper catheter. However, due to a type III arch we changed to  an H1 catheter. We then catheterized the right vertebral artery.  Diagnostic intracranial runs were obtained in multiple projections.  Following this, we catheterized the left vertebral artery using an  angled taper catheter. Diagnostic intracranial runs were performed in  anteroposterior and lateral projections. Roadmap views were taken as  necessary for catheter navigation.     Upon completion of the procedure, the catheter was withdrawn and  subsequently the sheath was removed. Hemostasis was obtained by using  6F Angioseal Closure device. Patient tolerated the procedure well and  completed without any immediate complications. Patient was then  transferred to post-operative monitoring unit.      Interpretation of images:  Series number 1-2 demonstrates a right vertebral artery injection in  Town's projection and lateral projection. The distal V2 segment of the  right vertebral artery appears normal, the V3 segment demonstrates  tortuosity in the proximal segment. The distal segment appears normal.  The V4 segment of the right vertebral artery demonstrates a fusiform  dilatation which is apparent on the lateral projection. The lateral  projection provides the best view of the dissecting fusiform aneurysm.  In this view, the widest aneurysm measurement is 5.54 mm, the length  of the fusiform segment  is 20.61 mm. The caliber of the vessel distal  to the aneurysm measures 2.49 mm and the vessel proximal to the  aneurysm measures 2.96 mm. The right sided posterior inferior  cerebellar artery is absent. There is a anterior inferior cerebellar  artery-posterior inferior cerebellar artery complex noted on the right  side. The left anterior inferior cerebellar arteries also visualized  and appears normal. The basilar trunk itself appears normal and  terminates into bilateral posterior cerebral arteries. The bilateral  superior cerebellar arteries are also normal.     Series number 4 demonstrates the best AP projection of the dissecting  fusiform aneurysm. In this view, the vessel segment distal to the  aneurysm measures 3.08 mm , the vessel segment proximal to the  aneurysm measures 2.77 mm. The curved distance measurement across the  fusiform aneurysm measures 33.79 mm.     Series #5 Left vertebral artery: Cranial view  Intracranial view of the left vertebral artery in the anteroposterior,  lateral projections demonstrates a normal left vertebral artery. The  left posterior inferior cerebellar artery originates from the distal  left vertebral artery. The basilar artery is patent and bifurcates  into bilateral posterior cerebral arteries. The bilateral superior  cerebellar arteries are normal. The capillary and venous phases are  normal.     Right common femoral artery: Pelvic view  Through the 5-Yoruba sheath angiography was performed over the right  groin. Pelvic view of the right common femoral artery in the RAVI  projection demonstrates a normal right common femoral artery,  superficial femoral artery, and deep femoral artery. The sheath is  located above the bifurcation. There is no significant  atherosclerosis, stenosis, dissection or pseudoaneurysm noted.     Dr. Larry Royal was present for the entire procedure.                                                                      Impression:     *  A  dissecting fusiform aneurysm is noted in the V4 segment of the  right vertebral artery measuring 5.54 mm in the widest segment and  20.61 mm in length. The thrombosed part of the pseudoaneurysm is  visualized better on T2 sequence of the MRI brain.     Plan:     *  Clinic visit in 2 weeks  *  Plan for Endovascular Flow Diversion.   *  Start DAPT     Toby Mcgraw MD, MPH  Neuroendovascular Surgery Fellow  Pager: [phone]   This result has not been signed. Information might be incomplete.     EXAM: CTA CHEST ABDOMEN PELVIS WITH RUNOFF     TECHNIQUE: Helical acquisition through the bilateral toes was  performed during the arterial phase of contrast enhancement following  the administration of 100 mL Isovue intravenous contrast. 2D and 3D  reconstructions performed by the CT technologist. Dose reduction  techniques were used.     INDICATION: History of vertebral artery aneurysm resulting in  dissection and thrombus, evaluating for additional vascular disease     COMPARISON: No pertinent comparison study is available for review.      CHEST ARTERIAL FINDINGS  Ascending aorta: Patent.  Aortic Arch: Patent with a three-vessel branching arch.  Descending aorta: Patent.     ABDOMEN ARTERIAL FINDINGS  Abdominal aorta: Patent. No fusiform dilation.  Celiac artery: Patent  Superior Mesenteric Artery: Patent  Renal Arteries: Patent  Inferior Mesenteric Artery: Patent     RIGHT LOWER EXTREMITY ARTERIAL FINDINGS  Common Iliac: Patent  External Iliac: Patent  Internal Iliac: Patent  Common Femoral: Patent  SFA: Patent  Profunda: Patent  Popiteal: Patent  Anterior Tibial: Patent  Peroneal: Patent  Posterior Tibial: Patent     LEFT LOWER EXTREMITY ARTERIAL FINDINGS  Common Iliac: Patent  External Iliac: Patent  Internal Iliac: Patent  Common Femoral: Patent  SFA: Patent  Profunda: Patent  Popiteal: Patent  Anterior Tibial: Patent  Peroneal: Patent  Posterior Tibial: Patent     NON-ARTERIAL FINDINGS  Limited evaluation of the  solid intraperitoneal organs and bowel  secondary to the arterial timing of this exam. Within this limitation,  the gallbladder is partially collapsed. Scattered colonic diverticula.  Appendix is normal. Multiple subcentimeter bilateral inguinal lymph  nodes. Mild right groin fat stranding surrounding the common femoral  artery which is likely secondary to recent intervention. No right  groin pseudoaneurysm, dissection or arterial venous fistula is seen.                                                                      IMPRESSION:  1.  Unremarkable CTA chest, abdomen, pelvis and bilateral lower  extremity's.  2.  Postprocedural changes within the right groin with no  pseudoaneurysm, dissection or arteriovenous fistula.     VALENTINO JACOBS MD        Assessment and Plan:     1. 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 The thrombosed part of the pseudoaneurysm is visualized better on T2 sequence of the MRI brain )    Successful endovascular treatment of a giant partially-thrombosed symptomatic right post-PICA vertebral artery aneurysm with the Pipeline device. At [facility] 3/14/2024 by   Antiplatelet medications changed by neurosurgery service, previously Brilinta plus baby aspirin currently taking Plavix and baby aspirin daily, they are planning to continue this dual antiplatelet combination until middle of April 2025 then followed by switch to the full dose aspirin 325 mg daily  He is a lifetime candidate for aspirin    2.  Hx of TIA (transient ischemic attack) 2/13/2024    For full details please see my previous notes    3.  Hyperlipidemia    4.  Hypertension    5.  IFG    I have reviewed his recent imaging studies, neurosurgery evaluation and recent ER evaluation  Imaging studies looks good  Symptoms resolved  Labs  looks good  Home blood pressure readings are excellent range  Tolerating dual antiplatelet agent baby aspirin plus Plavix  Intermittent left ankle swelling probably due to amlodipine  He is also having some sleep issues waking up in the middle of the night and having difficult to go back to sleep might be contributing twitching of the eyelids  He has no evidence of MFS, LDS, EDS on exam  No family history of arterial or organ rupture  Previous head and neck CTA no evidence of FMD in carotid arteries  Aortic arch and branch vessels looks good  Recent CTA CAP with leg run off , normal     Goal of blood pressure less than 130/80  Goal of LDL less than 70  Continue current medications refilled    He was advised avoiding any type of chiropractic neck manipulations or deep neck massage or roller coaster rides or heavy free weight lifting or sudden jerky neck movements etc.    Suggested follow neurosurgery recommendations and surveillance imaging studies  Continue antiplatelet medications per neurosurgery service    Will plan for repeat fasting labs in 6 months then followed by virtual or office visit    30 minutes spent on the date of the encounter doing chart review, review of recent ER evaluation, imaging studies, neurosurgery evaluation, lab results, history, exam, documentation and addressed above-mentioned issues medications refilled AVS with written instructions given    The longitudinal care of plan for the above diagnoses was addressed during this visit. Due to added complexity of care, we will continue to supprt Silas Lima and the subsequent management of this/these conditions and with ongoing continuity of care for this/these conditions.         Nata Becerra MD,FAHA,FSVM,FNLA, FACP  Vascular Medicine  Clinical Hypertension Specialist   Clinical Lipidologist

## 2025-03-20 NOTE — PATIENT INSTRUCTIONS
Stay on aspirin and Plavix until April 15, 2025. Our team will contact you to discontinue the Plavix at that time and start aspirin 325 mg daily.    Repeat MRI/MRA brain without contrast in 5 months at [facility]

## 2025-04-11 ENCOUNTER — MYC MEDICAL ADVICE (OUTPATIENT)
Dept: NEUROSURGERY | Facility: CLINIC | Age: 50
End: 2025-04-11
Payer: COMMERCIAL

## 2025-04-15 NOTE — TELEPHONE ENCOUNTER
Spoke with patient. States for 5 minutes this morning he had trouble focusing, in addition to a visual aura - floating brightness in periphery.     Denies stroke symptoms.     Discontinued Plavix and started 325 aspirin on Sunday, 4/13/25. Wonders if vision symptoms may be due to that as he had same symptoms for 3 days, followed by diplopia on day 4 when he discontinued Brilinta in January 2025.     He would like to know if Dr. Royal has any recommendations, such as tapering off Plavix?    Will contact patient after consulting with Dr. Royal.     Patient verbalized understanding.      Karon Arguelles RN 4/15/2025 2:36 PM     Addendum: Per Dr. Royal no change in medication/recommendation to taper. Monitor symptoms. Call if symptoms become more frequent. Updated patient via [employer].     Karon Arguelles RN 4/16/2025 3:33 PM

## 2025-04-17 NOTE — TELEPHONE ENCOUNTER
Per message from Dr. Royal,   If they are temporary I'm less worried. I'm sure he is eager to get back on Plavix. Let's do Aspirin and Plavix for a month and then maybe we will stop the aspirin and continue on Plavix only.   Jacqueline Burks RN 4/17/2025 4:35 PM

## 2025-04-19 ENCOUNTER — HEALTH MAINTENANCE LETTER (OUTPATIENT)
Age: 50
End: 2025-04-19

## 2025-06-28 ENCOUNTER — ANCILLARY PROCEDURE (OUTPATIENT)
Dept: MRI IMAGING | Facility: CLINIC | Age: 50
End: 2025-06-28
Attending: NEUROLOGICAL SURGERY
Payer: COMMERCIAL

## 2025-06-28 DIAGNOSIS — I72.6 ANEURYSM OF RIGHT VERTEBRAL ARTERY: ICD-10-CM

## 2025-06-28 DIAGNOSIS — I67.1 CEREBRAL ANEURYSM: ICD-10-CM

## 2025-06-28 PROCEDURE — 70551 MRI BRAIN STEM W/O DYE: CPT | Performed by: RADIOLOGY

## 2025-06-28 NOTE — DISCHARGE INSTRUCTIONS
MRI Contrast Discharge Instructions    The IV contrast you received today will pass out of your body in your  urine. This will happen in the next 24 hours. You will not feel this process.  Your urine will not change color.    Drink at least 4 extra glasses of water or juice today (unless your doctor  has restricted your fluids). This reduces the stress on your kidneys.  You may take your regular medicines.    If you are on dialysis: It is best to have dialysis today.    If you have a reaction: Most reactions happen right away. If you have  any new symptoms after leaving the hospital (such as hives or swelling),  call your hospital at the correct number below. Or call your family doctor.  If you have breathing distress or wheezing, call 911.    Special instructions: ***    I have read and understand the above information.    Signature:______________________________________ Date:___________    Staff:__________________________________________ Date:___________     Time:__________    [facility]:    ___[place]: [phone]  ___[facility]: [phone]  ___[place]: [phone] ___[facility]: [phone]  ___[facility]: [phone]  ___[facility]: [phone]  ___[phone]  ___[facility]: [phone]  ___[phone]

## (undated) RX ORDER — HEPARIN SODIUM 1000 [USP'U]/ML
INJECTION, SOLUTION INTRAVENOUS; SUBCUTANEOUS
Status: DISPENSED
Start: 2024-02-14

## (undated) RX ORDER — LIDOCAINE HYDROCHLORIDE 10 MG/ML
INJECTION, SOLUTION INFILTRATION; PERINEURAL
Status: DISPENSED
Start: 2024-02-14

## (undated) RX ORDER — FENTANYL CITRATE 50 UG/ML
INJECTION, SOLUTION INTRAMUSCULAR; INTRAVENOUS
Status: DISPENSED
Start: 2024-02-14

## (undated) RX ORDER — HEPARIN SODIUM 200 [USP'U]/100ML
INJECTION, SOLUTION INTRAVENOUS
Status: DISPENSED
Start: 2024-02-14

## (undated) RX ORDER — VERAPAMIL HYDROCHLORIDE 2.5 MG/ML
INJECTION, SOLUTION INTRAVENOUS
Status: DISPENSED
Start: 2024-02-14

## (undated) RX ORDER — NITROGLYCERIN 5 MG/ML
VIAL (ML) INTRAVENOUS
Status: DISPENSED
Start: 2024-02-14